# Patient Record
Sex: FEMALE | Race: ASIAN | NOT HISPANIC OR LATINO | URBAN - METROPOLITAN AREA
[De-identification: names, ages, dates, MRNs, and addresses within clinical notes are randomized per-mention and may not be internally consistent; named-entity substitution may affect disease eponyms.]

---

## 2017-01-05 ENCOUNTER — EMERGENCY (EMERGENCY)
Facility: HOSPITAL | Age: 72
LOS: 1 days | Discharge: LEFT BEFORE DISCHARGE | End: 2017-01-05
Admitting: EMERGENCY MEDICINE

## 2018-09-01 ENCOUNTER — OUTPATIENT (OUTPATIENT)
Dept: OUTPATIENT SERVICES | Facility: HOSPITAL | Age: 73
LOS: 1 days | End: 2018-09-01
Payer: MEDICAID

## 2018-09-06 ENCOUNTER — FORM ENCOUNTER (OUTPATIENT)
Age: 73
End: 2018-09-06

## 2018-09-06 ENCOUNTER — APPOINTMENT (OUTPATIENT)
Dept: INTERNAL MEDICINE | Facility: CLINIC | Age: 73
End: 2018-09-06
Payer: MEDICAID

## 2018-09-06 VITALS
SYSTOLIC BLOOD PRESSURE: 125 MMHG | BODY MASS INDEX: 24.24 KG/M2 | DIASTOLIC BLOOD PRESSURE: 75 MMHG | WEIGHT: 142 LBS | TEMPERATURE: 98.6 F | HEIGHT: 64 IN

## 2018-09-06 DIAGNOSIS — Z82.3 FAMILY HISTORY OF STROKE: ICD-10-CM

## 2018-09-06 DIAGNOSIS — Z78.9 OTHER SPECIFIED HEALTH STATUS: ICD-10-CM

## 2018-09-06 PROCEDURE — 80047 BASIC METABLC PNL IONIZED CA: CPT | Mod: QW

## 2018-09-06 PROCEDURE — 99204 OFFICE O/P NEW MOD 45 MIN: CPT | Mod: 25

## 2018-09-06 PROCEDURE — 36415 COLL VENOUS BLD VENIPUNCTURE: CPT

## 2018-09-06 PROCEDURE — 93000 ELECTROCARDIOGRAM COMPLETE: CPT

## 2018-09-07 ENCOUNTER — LABORATORY RESULT (OUTPATIENT)
Age: 73
End: 2018-09-07

## 2018-09-07 ENCOUNTER — APPOINTMENT (OUTPATIENT)
Dept: RADIOLOGY | Facility: CLINIC | Age: 73
End: 2018-09-07
Payer: MEDICAID

## 2018-09-07 ENCOUNTER — OUTPATIENT (OUTPATIENT)
Dept: OUTPATIENT SERVICES | Facility: HOSPITAL | Age: 73
LOS: 1 days | End: 2018-09-07
Payer: MEDICAID

## 2018-09-07 DIAGNOSIS — R06.02 SHORTNESS OF BREATH: ICD-10-CM

## 2018-09-07 PROCEDURE — 71046 X-RAY EXAM CHEST 2 VIEWS: CPT | Mod: 26

## 2018-09-07 PROCEDURE — 71046 X-RAY EXAM CHEST 2 VIEWS: CPT

## 2018-09-13 ENCOUNTER — APPOINTMENT (OUTPATIENT)
Dept: CARDIOLOGY | Facility: CLINIC | Age: 73
End: 2018-09-13
Payer: MEDICAID

## 2018-09-13 PROCEDURE — 93306 TTE W/DOPPLER COMPLETE: CPT

## 2018-09-15 ENCOUNTER — INPATIENT (INPATIENT)
Facility: HOSPITAL | Age: 73
LOS: 2 days | Discharge: HOME | DRG: 287 | End: 2018-09-18
Attending: INTERNAL MEDICINE | Admitting: HOSPITALIST
Payer: MEDICAID

## 2018-09-15 ENCOUNTER — CHART COPY (OUTPATIENT)
Age: 73
End: 2018-09-15

## 2018-09-15 VITALS
SYSTOLIC BLOOD PRESSURE: 164 MMHG | OXYGEN SATURATION: 97 % | TEMPERATURE: 98 F | HEART RATE: 46 BPM | RESPIRATION RATE: 18 BRPM | DIASTOLIC BLOOD PRESSURE: 65 MMHG

## 2018-09-15 LAB
ALBUMIN SERPL ELPH-MCNC: 4.6 G/DL — SIGNIFICANT CHANGE UP (ref 3.3–5)
ALP SERPL-CCNC: 75 U/L — SIGNIFICANT CHANGE UP (ref 40–120)
ALT FLD-CCNC: 43 U/L — SIGNIFICANT CHANGE UP (ref 10–45)
ANION GAP SERPL CALC-SCNC: 11 MMOL/L — SIGNIFICANT CHANGE UP (ref 5–17)
APTT BLD: 27.7 SEC — SIGNIFICANT CHANGE UP (ref 27.5–37.4)
AST SERPL-CCNC: 34 U/L — SIGNIFICANT CHANGE UP (ref 10–40)
BASOPHILS # BLD AUTO: 0.1 K/UL — SIGNIFICANT CHANGE UP (ref 0–0.2)
BASOPHILS NFR BLD AUTO: 1.2 % — SIGNIFICANT CHANGE UP (ref 0–2)
BILIRUB SERPL-MCNC: 0.6 MG/DL — SIGNIFICANT CHANGE UP (ref 0.2–1.2)
BUN SERPL-MCNC: 6 MG/DL — LOW (ref 7–23)
CALCIUM SERPL-MCNC: 9.7 MG/DL — SIGNIFICANT CHANGE UP (ref 8.4–10.5)
CHLORIDE SERPL-SCNC: 101 MMOL/L — SIGNIFICANT CHANGE UP (ref 96–108)
CO2 SERPL-SCNC: 26 MMOL/L — SIGNIFICANT CHANGE UP (ref 22–31)
CREAT SERPL-MCNC: 0.78 MG/DL — SIGNIFICANT CHANGE UP (ref 0.5–1.3)
EOSINOPHIL # BLD AUTO: 0.1 K/UL — SIGNIFICANT CHANGE UP (ref 0–0.5)
EOSINOPHIL NFR BLD AUTO: 3 % — SIGNIFICANT CHANGE UP (ref 0–6)
GLUCOSE SERPL-MCNC: 200 MG/DL — HIGH (ref 70–99)
HCT VFR BLD CALC: 41.7 % — SIGNIFICANT CHANGE UP (ref 34.5–45)
HGB BLD-MCNC: 13.7 G/DL — SIGNIFICANT CHANGE UP (ref 11.5–15.5)
INR BLD: 0.94 RATIO — SIGNIFICANT CHANGE UP (ref 0.88–1.16)
LYMPHOCYTES # BLD AUTO: 1.6 K/UL — SIGNIFICANT CHANGE UP (ref 1–3.3)
LYMPHOCYTES # BLD AUTO: 32.7 % — SIGNIFICANT CHANGE UP (ref 13–44)
MAGNESIUM SERPL-MCNC: 2.3 MG/DL — SIGNIFICANT CHANGE UP (ref 1.6–2.6)
MCHC RBC-ENTMCNC: 29 PG — SIGNIFICANT CHANGE UP (ref 27–34)
MCHC RBC-ENTMCNC: 32.9 GM/DL — SIGNIFICANT CHANGE UP (ref 32–36)
MCV RBC AUTO: 88.1 FL — SIGNIFICANT CHANGE UP (ref 80–100)
MONOCYTES # BLD AUTO: 0.5 K/UL — SIGNIFICANT CHANGE UP (ref 0–0.9)
MONOCYTES NFR BLD AUTO: 10.5 % — SIGNIFICANT CHANGE UP (ref 2–14)
NEUTROPHILS # BLD AUTO: 2.6 K/UL — SIGNIFICANT CHANGE UP (ref 1.8–7.4)
NEUTROPHILS NFR BLD AUTO: 52.6 % — SIGNIFICANT CHANGE UP (ref 43–77)
PHOSPHATE SERPL-MCNC: 3 MG/DL — SIGNIFICANT CHANGE UP (ref 2.5–4.5)
PLATELET # BLD AUTO: 255 K/UL — SIGNIFICANT CHANGE UP (ref 150–400)
POTASSIUM SERPL-MCNC: 3.7 MMOL/L — SIGNIFICANT CHANGE UP (ref 3.5–5.3)
POTASSIUM SERPL-SCNC: 3.7 MMOL/L — SIGNIFICANT CHANGE UP (ref 3.5–5.3)
PROT SERPL-MCNC: 7.6 G/DL — SIGNIFICANT CHANGE UP (ref 6–8.3)
PROTHROM AB SERPL-ACNC: 10.1 SEC — SIGNIFICANT CHANGE UP (ref 9.8–12.7)
RBC # BLD: 4.73 M/UL — SIGNIFICANT CHANGE UP (ref 3.8–5.2)
RBC # FLD: 12.3 % — SIGNIFICANT CHANGE UP (ref 10.3–14.5)
SODIUM SERPL-SCNC: 138 MMOL/L — SIGNIFICANT CHANGE UP (ref 135–145)
TROPONIN T, HIGH SENSITIVITY RESULT: <6 NG/L — SIGNIFICANT CHANGE UP (ref 0–51)
TROPONIN T, HIGH SENSITIVITY RESULT: <6 NG/L — SIGNIFICANT CHANGE UP (ref 0–51)
WBC # BLD: 4.9 K/UL — SIGNIFICANT CHANGE UP (ref 3.8–10.5)
WBC # FLD AUTO: 4.9 K/UL — SIGNIFICANT CHANGE UP (ref 3.8–10.5)

## 2018-09-15 PROCEDURE — 99218: CPT

## 2018-09-15 PROCEDURE — 93010 ELECTROCARDIOGRAM REPORT: CPT

## 2018-09-15 PROCEDURE — 71046 X-RAY EXAM CHEST 2 VIEWS: CPT | Mod: 26

## 2018-09-15 RX ORDER — AMLODIPINE BESYLATE 2.5 MG/1
10 TABLET ORAL DAILY
Qty: 0 | Refills: 0 | Status: DISCONTINUED | OUTPATIENT
Start: 2018-09-15 | End: 2018-09-16

## 2018-09-15 RX ORDER — ASPIRIN/CALCIUM CARB/MAGNESIUM 324 MG
325 TABLET ORAL ONCE
Qty: 0 | Refills: 0 | Status: COMPLETED | OUTPATIENT
Start: 2018-09-15 | End: 2018-09-15

## 2018-09-15 RX ORDER — SIMVASTATIN 20 MG/1
20 TABLET, FILM COATED ORAL AT BEDTIME
Qty: 0 | Refills: 0 | Status: DISCONTINUED | OUTPATIENT
Start: 2018-09-15 | End: 2018-09-18

## 2018-09-15 RX ORDER — ASPIRIN/CALCIUM CARB/MAGNESIUM 324 MG
81 TABLET ORAL DAILY
Qty: 0 | Refills: 0 | Status: DISCONTINUED | OUTPATIENT
Start: 2018-09-15 | End: 2018-09-18

## 2018-09-15 RX ADMIN — Medication 30 MILLILITER(S): at 14:56

## 2018-09-15 RX ADMIN — Medication 325 MILLIGRAM(S): at 13:58

## 2018-09-15 NOTE — ED ADULT NURSE NOTE - CHPI ED NUR SYMPTOMS NEG
no congestion/no chills/no back pain/no nausea/no fever/no shortness of breath/no syncope/no vomiting

## 2018-09-15 NOTE — ED PROVIDER NOTE - PROGRESS NOTE DETAILS
Discussed with Dr. Fuller (covering for patient's cardiologist Dr. Shelton). Would like patient to be admitted for stress or CTA to hospitalist. Discussed with hospitalist & CDU; will place in CDU if delta troponin negative.

## 2018-09-15 NOTE — ED PROVIDER NOTE - NS ED ROS FT
CONSTITUTIONAL: no fevers  HEENT: no dysphagia  CV: + chest pain  RESP: + SOB  GI: no nausea/vomiting  : no dysuria  DERM: no rash  MSK: no back pain  NEURO: no LOC  ENDO: no diabetes

## 2018-09-15 NOTE — ED PROVIDER NOTE - INTERPRETATION
EKG reviewed for rate, rhythm, axis, intervals and segments, including QRS morphology, P wave appearance T wave appearance, AZ interval, and QT interval.  I find the EKG to be unremarkable in all of these regards except as follows: bigeminy

## 2018-09-15 NOTE — ED PROVIDER NOTE - PHYSICAL EXAMINATION
GEN: calm, cooperative, ENT: mucous membranes moist, HEAD: NCAT, CV: S1 S2 no murmurs, RESP: no respiratory distress, ABD: no abdominal TTP, MSK: moves all extremities, NEURO: awake, alert

## 2018-09-15 NOTE — ED CDU PROVIDER INITIAL DAY NOTE - OBJECTIVE STATEMENT
71 y/o F hx of HTN, HLD presents with chest pain. Patient has been experiencing intermittent chest pain for 2-3 months, but this morning awoke with substernal chest tightness radiating to left shoulder associated with dizziness, nausea & diaphoresis. States pain has been constant since this AM; not associated with exertion. Patient did not take ASA prior to arrival. Denies hx of stress test; notified her cardiologist who sent her into ED.   PMD: Enrrique Roman; Cardiologist: Linden Shelton   ID #[id number] used for mandarin interpretation.    In ED CBC, CMP, tropx 2 and coags wnl. CXR shows thoracic aortic atheromatous changes and ectasia noted and stable mild cardiomegaly but otherwise no acute process. EKG with bigeminy. Pt sent to CDU for tele monitoring, frequent evals, and stress test. Case discussed with Dr. De La Cruz. 73 y/o F hx of HTN, HLD presents with chest pain. Patient has been experiencing intermittent chest pain for 2-3 months, but this morning awoke with substernal chest tightness radiating to left shoulder associated with dizziness, nausea & diaphoresis. States pain has been constant since this AM; not associated with exertion. Patient did not take ASA prior to arrival. Denies hx of stress test; notified her cardiologist who sent her into ED.   PMD: Enrrique Roman; Cardiologist: Linden Shelton   ID #[id number] used for mandarin interpretation.    In ED CBC, CMP, tropx 2 and coags wnl. CXR shows thoracic aortic atheromatous changes and ectasia noted and stable mild cardiomegaly but otherwise no acute process. EKG with bigeminy. Pt sent to CDU for tele monitoring, frequent evals, and stress test. Case d/w patients cardiologist. Case discussed with Dr. De La Cruz.

## 2018-09-15 NOTE — ED ADULT NURSE NOTE - OBJECTIVE STATEMENT
72y Female PMH htn, hld presents to the ED c/o CP. 72y Female PMH htn, hld presents to the ED c/o CP. Pt reports intermittent Cp for 2-3 months worsening in the last week, Pt saw cardiologist Linden Shelton last week and has an echo on Thursday (has not received results yet). Pt reports the CP starting last night but stating it was mild and then reports increased pain after breakfast this morning 72y Female PMH htn, hld presents to the ED c/o CP. Pt is Mandarin speaking,  used to obtain information. Pt reports intermittent Cp for 2-3 months worsening in the last week, Pt saw cardiologist Linden Shelton last week and has an echo on Thursday (has not received results yet). Pt reports the CP starting last night but stating it was mild and then reports increased pain after breakfast this morning. Pt reports discomfort to L. side of chest radiating to neck, stating she feels "vibrations to neck." Pt states chest feels tight and she has some numbness to L. shoulder. Pt presents a&oX4, ambulatory, well in appearance, airway intact 72y Female PMH htn, hld presents to the ED c/o CP. Pt is Mandarin speaking,  used to obtain information. Pt reports intermittent Cp for 2-3 months worsening in the last week, Pt saw cardiologist Linden Shelton last week and has an echo on Thursday (has not received results yet). Pt reports the CP starting last night but stating it was mild and then reports increased pain after breakfast this morning. Pt reports discomfort to L. side of chest radiating to neck, stating she feels "vibrations to neck." Pt reported some dizziness, nausea and diaphoresis this morning with the onset of pain. Pt states chest feels tight and she has some numbness to L. shoulder. Pt presents a&oX4, ambulatory, well in appearance, non-diaphoretic, airway intact, breathing spontaneously and unlabored, lung sounds clear bilaterally, skin warm dry and intact, cap refill <3 seconds, +peripheral pulses X4, denies n/v, SOB, ha, numbness/tingling to extremities, palpitations, chills/fever. MD at bedside for eval. safety maintained.

## 2018-09-15 NOTE — ED ADULT NURSE NOTE - NSIMPLEMENTINTERV_GEN_ALL_ED
Implemented All Universal Safety Interventions:  [place] to call system. Call bell, personal items and telephone within reach. Instruct patient to call for assistance. Room bathroom lighting operational. Non-slip footwear when patient is off stretcher. Physically safe environment: no spills, clutter or unnecessary equipment. Stretcher in lowest position, wheels locked, appropriate side rails in place.

## 2018-09-15 NOTE — ED PROVIDER NOTE - OBJECTIVE STATEMENT
PMD: Enrrique Roman  Cardiologist: Linden Shelton 71 y/o F hx of HTN, HLD presents with chest pain.    Patient has been experiencing intermittent chest pain for 2-3 months, but this morning awoke with substernal chest tightness radiating to left shoulder associated with dizziness, nausea & diaphoresis. States pain has been constant since this AM; not associated with exertion. Patient did not take ASA prior to arrival. Denies hx of stress test; notified her cardiologist who sent her into ED.     PMD: Enrrique Roman  Cardiologist: Linden Shelton

## 2018-09-15 NOTE — ED CDU PROVIDER INITIAL DAY NOTE - PROGRESS NOTE DETAILS
Patient is still in the [place] area. Sign out from SERGIO Campuzano, patient to get stress test in the morning, tele, frequent eval. Pt arrived from [place], NAD, comfortable, telemetry shows occasional PVCs. Pt with no current complaints according to son-in law at bedside. Will continue to monitor. Vel  #[id number] - Pt seen at bedside. Pt in NAD, comfortable. VS stable from last reading. No events on tele. Pt has no complaints at this time, she states that her CP resolved breifly after taking Maalox and was greatly improved after taking aspirin, currently not c/o CP and declines medications. pt states she took her home medications, which she has on her, at 20:30. Pt denies SOB, palpitations, abdo pain, n/v, numbness, paresthesias, weakness, new back pain (has chronic low back pain), recent illness, cough, f/c. Pt was told to notify staff if she experiences any of these sxs or of she has any concerns. Pt was informed she will have a stress test in the morning, told not to consume any caffeine, she states that she can walk on a treadmill without issue. Will continue to monitor.

## 2018-09-15 NOTE — ED PROVIDER NOTE - ATTENDING CONTRIBUTION TO CARE
73 yo female with intermittent chest tightness for several months, worse this AM; had echo in cardiologist's office a few days ago, results pending.  Called the office today and was told to come to ER.  Still having mild residual symptoms, in ventricular bigeminy (seen on prior).  Will give ASA, send cardiac enzymes, discuss with cardiology (Linden Shelton) and reassess.

## 2018-09-15 NOTE — ED CDU PROVIDER INITIAL DAY NOTE - DETAILS
CHEST PAIN  -[facility]  -[facility]  -[facility] EVAL  -STRESS TEST  -CASE D/W ATTENDING Dr. De La Cruz

## 2018-09-16 DIAGNOSIS — I49.3 VENTRICULAR PREMATURE DEPOLARIZATION: ICD-10-CM

## 2018-09-16 DIAGNOSIS — R07.89 OTHER CHEST PAIN: ICD-10-CM

## 2018-09-16 DIAGNOSIS — E78.5 HYPERLIPIDEMIA, UNSPECIFIED: ICD-10-CM

## 2018-09-16 DIAGNOSIS — I10 ESSENTIAL (PRIMARY) HYPERTENSION: ICD-10-CM

## 2018-09-16 DIAGNOSIS — R07.9 CHEST PAIN, UNSPECIFIED: ICD-10-CM

## 2018-09-16 DIAGNOSIS — Z29.9 ENCOUNTER FOR PROPHYLACTIC MEASURES, UNSPECIFIED: ICD-10-CM

## 2018-09-16 DIAGNOSIS — R73.03 PREDIABETES: ICD-10-CM

## 2018-09-16 LAB
ANION GAP SERPL CALC-SCNC: 12 MMOL/L — SIGNIFICANT CHANGE UP (ref 5–17)
BUN SERPL-MCNC: 20 MG/DL — SIGNIFICANT CHANGE UP (ref 7–23)
CALCIUM SERPL-MCNC: 9.3 MG/DL — SIGNIFICANT CHANGE UP (ref 8.4–10.5)
CHLORIDE SERPL-SCNC: 104 MMOL/L — SIGNIFICANT CHANGE UP (ref 96–108)
CHOLEST SERPL-MCNC: 142 MG/DL — SIGNIFICANT CHANGE UP (ref 10–199)
CO2 SERPL-SCNC: 25 MMOL/L — SIGNIFICANT CHANGE UP (ref 22–31)
CREAT SERPL-MCNC: 0.8 MG/DL — SIGNIFICANT CHANGE UP (ref 0.5–1.3)
GLUCOSE SERPL-MCNC: 102 MG/DL — HIGH (ref 70–99)
HBA1C BLD-MCNC: 6.1 % — HIGH (ref 4–5.6)
HDLC SERPL-MCNC: 41 MG/DL — LOW
LIPID PNL WITH DIRECT LDL SERPL: 78 MG/DL — SIGNIFICANT CHANGE UP
MAGNESIUM SERPL-MCNC: 2.5 MG/DL — SIGNIFICANT CHANGE UP (ref 1.6–2.6)
PHOSPHATE SERPL-MCNC: 3.6 MG/DL — SIGNIFICANT CHANGE UP (ref 2.5–4.5)
POTASSIUM SERPL-MCNC: 4 MMOL/L — SIGNIFICANT CHANGE UP (ref 3.5–5.3)
POTASSIUM SERPL-SCNC: 4 MMOL/L — SIGNIFICANT CHANGE UP (ref 3.5–5.3)
SODIUM SERPL-SCNC: 141 MMOL/L — SIGNIFICANT CHANGE UP (ref 135–145)
TOTAL CHOLESTEROL/HDL RATIO MEASUREMENT: 3.5 RATIO — SIGNIFICANT CHANGE UP (ref 3.3–7.1)
TRIGL SERPL-MCNC: 117 MG/DL — SIGNIFICANT CHANGE UP (ref 10–149)

## 2018-09-16 PROCEDURE — 99223 1ST HOSP IP/OBS HIGH 75: CPT | Mod: GC

## 2018-09-16 PROCEDURE — 99217: CPT

## 2018-09-16 PROCEDURE — 99222 1ST HOSP IP/OBS MODERATE 55: CPT

## 2018-09-16 PROCEDURE — 78452 HT MUSCLE IMAGE SPECT MULT: CPT | Mod: 26

## 2018-09-16 PROCEDURE — 93016 CV STRESS TEST SUPVJ ONLY: CPT

## 2018-09-16 PROCEDURE — 93018 CV STRESS TEST I&R ONLY: CPT

## 2018-09-16 RX ORDER — SIMVASTATIN 20 MG/1
1 TABLET, FILM COATED ORAL
Qty: 0 | Refills: 0 | COMMUNITY

## 2018-09-16 RX ORDER — METOPROLOL TARTRATE 50 MG
25 TABLET ORAL
Qty: 0 | Refills: 0 | Status: DISCONTINUED | OUTPATIENT
Start: 2018-09-16 | End: 2018-09-16

## 2018-09-16 RX ORDER — ENOXAPARIN SODIUM 100 MG/ML
40 INJECTION SUBCUTANEOUS EVERY 24 HOURS
Qty: 0 | Refills: 0 | Status: DISCONTINUED | OUTPATIENT
Start: 2018-09-16 | End: 2018-09-18

## 2018-09-16 RX ORDER — AMLODIPINE BESYLATE 2.5 MG/1
10 TABLET ORAL DAILY
Qty: 0 | Refills: 0 | Status: DISCONTINUED | OUTPATIENT
Start: 2018-09-16 | End: 2018-09-18

## 2018-09-16 RX ORDER — AMLODIPINE BESYLATE 2.5 MG/1
10 TABLET ORAL DAILY
Qty: 0 | Refills: 0 | Status: DISCONTINUED | OUTPATIENT
Start: 2018-09-16 | End: 2018-09-16

## 2018-09-16 RX ORDER — METOPROLOL TARTRATE 50 MG
25 TABLET ORAL
Qty: 0 | Refills: 0 | Status: DISCONTINUED | OUTPATIENT
Start: 2018-09-16 | End: 2018-09-18

## 2018-09-16 RX ADMIN — ENOXAPARIN SODIUM 40 MILLIGRAM(S): 100 INJECTION SUBCUTANEOUS at 22:03

## 2018-09-16 NOTE — ED CDU PROVIDER SUBSEQUENT DAY NOTE - HISTORY
Pt seen at bedside. Pt in NAD, sleeping comfortably. VS stable from last reading. No events on tele. Will continue to monitor.

## 2018-09-16 NOTE — ED CDU PROVIDER DISPOSITION NOTE - CLINICAL COURSE
71 y/o F hx of HTN, HLD presents with chest pain. Patient has been experiencing intermittent chest pain for 2-3 months, but this morning awoke with substernal chest tightness radiating to left shoulder associated with dizziness, nausea & diaphoresis. States pain has been constant since this AM; not associated with exertion. Patient did not take ASA prior to arrival. Denies hx of stress test; notified her cardiologist who sent her into ED.   	PMD: Enrrique Roman; Cardiologist: Linden Shelton  	 ID #[id number] used for mandarin interpretation.  In ED CBC, CMP, tropx 2 and coags wnl. CXR shows thoracic aortic atheromatous changes and ectasia noted and stable mild cardiomegaly but otherwise no acute process. EKG with bigeminy. Pt sent to CDU for tele monitoring, frequent evals, and stress test. Case d/w patients cardiologist.  In CDU: Pt had no overnight events. Stress test showed___ 73 y/o F hx of HTN, HLD presents with chest pain. Patient has been experiencing intermittent chest pain for 2-3 months, but this morning awoke with substernal chest tightness radiating to left shoulder associated with dizziness, nausea & diaphoresis. States pain has been constant since this AM; not associated with exertion. Patient did not take ASA prior to arrival. Denies hx of stress test; notified her cardiologist who sent her into ED.   	PMD: Enrrique Roman; Cardiologist: Linden Shelton  	 ID #[id number] used for mandarin interpretation.  In ED CBC, CMP, tropx 2 and coags wnl. CXR shows thoracic aortic atheromatous changes and ectasia noted and stable mild cardiomegaly but otherwise no acute process. EKG with bigeminy. Pt sent to CDU for tele monitoring, frequent evals, and stress test. Case d/w patients cardiologist.  In CDU: Pt had no overnight events. Stress test revealed normal perfusion scan, however patient with PVCs during test and bigeminy/trigeminy during recovery. Patient seen by cardiology NP and attending, Dr. Bush, recommending admission for further monitoring, TTE, and medication management.

## 2018-09-16 NOTE — H&P ADULT - NEGATIVE CARDIOVASCULAR SYMPTOMS
no paroxysmal nocturnal dyspnea/no chest pain/no dyspnea on exertion/no orthopnea/no peripheral edema

## 2018-09-16 NOTE — H&P ADULT - PROBLEM SELECTOR PLAN 2
-Multiple PVC noted on stress test and EKG  -Cardiology following.   -Monitor on tele  -C/w metoprolol tartrate 25mg BID. uptitrate as tolerated. -Multiple PVC noted on stress test and EKG  -Cardiology following.   -Monitor on tele  -Monitor BMP daily, keep K>4, Mg>2  -C/w metoprolol tartrate 25mg BID. uptitrate as tolerated.

## 2018-09-16 NOTE — ED CDU PROVIDER DISPOSITION NOTE - PLAN OF CARE
1. Follow up with your PMD in 1-2 days. If you do not have a PMD you may follow up with our general internal medicine clinic ([phone]) for continued care. Additionally please follow up with a cardiologist or cardiology clinic ([phone]) within 2-3 days.   2. Show copies of your reports given to you. Recommend Aspirin 81mg over the counter daily until further evaluation.  Take all of your other medications as previously prescribed.   3. If you develop any worsening or continued chest pain, shortness of breath, palpitations, weakness, nausea/vomiting, lightheadedness, or for any other concerning symptoms please return to the ED immediately.

## 2018-09-16 NOTE — H&P ADULT - HISTORY OF PRESENT ILLNESS
71 yo female with PMHx significant for HTN, HLD present with chest discomfort and tightness. Patient reports her symptoms started about 2-3 months ago, initially started with some chest discomfort and tightness (not pain per patient) in her midchest, radiating up her neck, associated with palpitation, dizziness and nausea, but no vomiting. She check her BP which showed heart rate of 40-60s. These symptoms mostly occur when she woke up in the morning and lasted for about an hour, and happened once every 2-3 days. However, her symptoms was getting more frequently for the past week. She went to see her cardiologist, and had EKG showed NSR with bigeminy, no acute ST/T wave changes. She had TTE done about 2-3 days ago, result still pending. Yesterday morning, she again felt the chest discomfort lasted for few hours, called her cardiologist and was advised to come to ED for further evaluation. She denied fever, chill, headache, diaphoresis, SOB, abdominal pain, acid reflux, diarrhea, myalgia or urinary symptoms.     In the ED, initial VS T 98F, HR 46, /65, RR 18 SpO2 97% on RA. Initial labs only significant for glu 200 (likely post-prandial), with negative troponin X2. CXR with mild cardiomegaly and no infiltrate/consolidation. EKG with NSR and bigeminy PVC, no acute ST/T wave abnormalities concerning for active ischemia. She was given asa 325mg X1. She was initially admitted to CDU, had stress test done which did not show inducible infarction/ischemia, however, showed  multiple PVCs with bigeminy and trigeminy during stress test and recovery. She was seen by cardiology and was recommended to admitted to telemetry for further workup and monitoring. 73 yo Mandarin speaking female with PMHx significant for HTN, HLD present with chest discomfort and tightness. Patient reports her symptoms started about 2-3 months ago, initially started with some chest discomfort and tightness (not pain per patient) in her midchest, radiating up her neck, associated with palpitation, dizziness and nausea, but no vomiting. She check her BP which showed heart rate of 40-60s. These symptoms mostly occur when she woke up in the morning and lasted for about an hour, and happened once every 2-3 days. However, her symptoms was getting more frequently for the past week. She went to see her cardiologist, and had EKG showed NSR with bigeminy, no acute ST/T wave changes. She had TTE done about 2-3 days ago, result still pending. Yesterday morning, she again felt the chest discomfort lasted for few hours, called her cardiologist and was advised to come to ED for further evaluation. She denied fever, chill, headache, diaphoresis, SOB, abdominal pain, acid reflux, diarrhea, myalgia or urinary symptoms.     In the ED, initial VS T 98F, HR 46, /65, RR 18 SpO2 97% on RA. Initial labs only significant for glu 200 (likely post-prandial), with negative troponin X2. CXR with mild cardiomegaly and no infiltrate/consolidation. EKG with NSR and bigeminy PVC, no acute ST/T wave abnormalities concerning for active ischemia. She was given asa 325mg X1. She was initially admitted to CDU, had stress test done which did not show inducible infarction/ischemia, however, showed  multiple PVCs with bigeminy and trigeminy during stress test and recovery. She was seen by cardiology and was recommended to admitted to telemetry for further workup and monitoring.

## 2018-09-16 NOTE — H&P ADULT - ASSESSMENT
73 yo female with PMHx significant for HTN, HLD present with chest discomfort and tightness, found to have negative stress test however with multiple bigeminy and trigeminy, admitted to telemetry for further evaluation. 71 yo Mandarin speaking female with PMHx significant for HTN, HLD present with chest discomfort and tightness, found to have negative stress test however with multiple bigeminy and trigeminy, admitted to telemetry for further evaluation.

## 2018-09-16 NOTE — H&P ADULT - NSHPLABSRESULTS_GEN_ALL_CORE
EKG personally reviewed. NRS with luca. No significant acute ST/T wave abnormalities. Labs personally reviewed:                          13.7   4.9   )-----------( 255      ( 15 Sep 2018 13:59 )             41.7     09-16    141  |  104  |  20  ----------------------------<  102<H>  4.0   |  25  |  0.80    Ca    9.3      16 Sep 2018 22:10  Phos  3.6     09-16  Mg     2.5     09-16    TPro  7.6  /  Alb  4.6  /  TBili  0.6  /  DBili  x   /  AST  34  /  ALT  43  /  AlkPhos  75  09-15        LIVER FUNCTIONS - ( 15 Sep 2018 13:59 )  Alb: 4.6 g/dL / Pro: 7.6 g/dL / ALK PHOS: 75 U/L / ALT: 43 U/L / AST: 34 U/L / GGT: x           PT/INR - ( 15 Sep 2018 13:59 )   PT: 10.1 sec;   INR: 0.94 ratio         PTT - ( 15 Sep 2018 13:59 )  PTT:27.7 sec    CAPILLARY BLOOD GLUCOSE      POCT Blood Glucose.: 102 mg/dL (16 Sep 2018 22:04)      Imaging:  CXR personally reviewed: no focal opacity    EKG personally reviewed. NRS with bigeminy. No significant acute ST/T wave abnormalities.

## 2018-09-16 NOTE — ED ADULT NURSE REASSESSMENT NOTE - COMFORT CARE
ambulated to bathroom/plan of care explained
plan of care explained
warm blanket provided/darkened lights/repositioned/po fluids offered/ambulated to bathroom/plan of care explained

## 2018-09-16 NOTE — ED CDU PROVIDER DISPOSITION NOTE - ATTENDING CONTRIBUTION TO CARE
MD Ca:  I have personally performed a face to face diagnostic evaluation on this patient with the PA.  I have reviewed the ACP note and agree with the history, exam, and plan of care, except as noted.  History and Exam by me shows [pacific  [id number]] 73 yo F, c/o palpitations since Friday night.  Duration: chronic = 3wk (been having PVCs; Primary cardiologist = Linden Shelton), acutely worse = 3d.  Context: states that her HR has been running in the "30s" based upon a portable HR monitor.  Sent to the CDU for Nuclear Stress test, and further bedside evaluation by Cardiology.  Quality: an uncomfortable "trembling" feeling in her chest.  Associated Sx:  patient denies CP/SOB, no N/V, no F/C.  Better/worse: symptoms have improved since getting to the ED yesterday morning.  VS:  wnl. Physical Exam: adult F, NAD, very pleasant, NCAT, PERRL, EOMI, neck supple, CTA, B, RRR, Abd: s/nd/nt.  Ext: no edema.  Ambulates w/o diff.  AAOx3.  Since arriving in the CDU, the patient continues to feel the occasional palpitation, but no more so than the baseline level she has been experiencing for the last 3wk.  Her Nuclear Stress test shows no perfusion defects, but she has demonstrated multiple PVC on her telemetry.  Chart review has demonstrated that the PVCs are a new problem, and that workup only started 3wk ago.   Impression:  frequent ectopy of unclear etiology, although stress testing would suggest the etiology is not ischemic.  Cardiology attending, Dr. Bush, has seen the patient at the bedside, and recommending admit for further w/u on tele, TTE, +/- EP consult.  Plan:  admit hospitalist with cardiology consulting.

## 2018-09-16 NOTE — CONSULT NOTE ADULT - ATTENDING COMMENTS
Frequent PVCS, with bigeminy and trigeminy during stress.   Will admit, watch on tele, echocardiogram and initiate betablocker therapy with metoprolol    Thanks

## 2018-09-16 NOTE — ED CDU PROVIDER SUBSEQUENT DAY NOTE - MEDICAL DECISION MAKING DETAILS
Impression:  frequent ectopy of unclear etiology, although stress testing would suggest the etiology is not ischemic.  Cardiology attending, Dr. Bush, has seen the patient at the bedside, and recommending admit for further w/u on tele, TTE, +/- EP consult.  Plan:  admit hospitalist with cardiology consulting.

## 2018-09-16 NOTE — ED CDU PROVIDER SUBSEQUENT DAY NOTE - ATTENDING CONTRIBUTION TO CARE
MD Ca:  I have personally performed a face to face diagnostic evaluation on this patient with the PA.  I have reviewed the ACP note and agree with the history, exam, and plan of care, except as noted.  History and Exam by me shows [pacific  [id number]] 73 yo F, c/o palpitations since Friday night.  Duration: chronic = 3wk (been having PVCs; Primary cardiologist = Linden Shelton), acutely worse = 3d.  Context: states that her HR has been running in the "30s" based upon a portable HR monitor.  Sent to the CDU for Nuclear Stress test, and further bedside evaluation by Cardiology.  Quality: an uncomfortable "trembling" feeling in her chest.  Associated Sx:  patient denies CP/SOB, no N/V, no F/C.  Better/worse: symptoms have improved since getting to the ED yesterday morning.  VS:  wnl. Physical Exam: adult F, NAD, very pleasant, NCAT, PERRL, EOMI, neck supple, CTA, B, RRR, Abd: s/nd/nt.  Ext: no edema.  Ambulates w/o diff.  AAOx3.  Since arriving in the CDU, the patient continues to feel the occasional palpitation, but no more so than the baseline level she has been experiencing for the last 3wk.  Her Nuclear Stress test shows no perfusion defects, but she has demonstrated multiple PVC on her telemetry.  Chart review has demonstrated that the PVCs are a new problem, and that workup only started 3wk ago.   Impression:  frequent ectopy of unclear etiology, although stress testing would suggest the etiology is not ischemic.  Cardiology attending, Dr. Bush, has seen the patient at the bedside, and recommending admit for further w/u on tele, TTE, +/- EP consult.  Plan:  admit hospitalist with cardiology consulting.

## 2018-09-16 NOTE — ED CDU PROVIDER SUBSEQUENT DAY NOTE - PROGRESS NOTE DETAILS
Pt in NAD, sleeping comfortably. VS stable from last reading. No events on tele. Will continue to monitor. Pt at off unit stress testing. Will follow results and reassess upon return to unit. - Dominic Campuzano PA-C Dr. Bush called and informed me of stress results. Stress itself was normal, however patient had frequent PVCs, bigeminy and trigeminy. ED MD made aware. Paged patient's cardiologist Dr. Linden Shelton. awaiting return page. - Dominic Campuzano PA-C Patient seen at bedside in NAD.  VSS.  Patient resting comfortably without complaints. No events on tele. Appears well. No current cp, sob, dizziness. Stress results pending. Will follow. - Dominic Campuzano PA-C Spoke with Dr. Cueva covering for Dr. Linden Shelton. Informed him of stress result and PVCs/bigeminy/trigeminy during stress and recovery. He recommended in-house cards consult. Will page fellow regarding consult. - Dominic Campuzano PA-C Patient signed out to me pending cardiology consult. Called to confirm cards fellow aware. Patient resting in bed comfortably in NAD. No complaints. Most recent VSS. No new events on telemetry monitor. Patient seen by cardiology NP and attending, Dr. Bush, recommending admission for further monitoring, TTE, and medication management. D/w Dr. Rodriguez. Patient seen by cardiology NP and attending, Dr. Bush, recommending admission for further monitoring, TTE, and medication management. D/w Dr. Rodriguez.  Mandarin  #[id number] used.

## 2018-09-16 NOTE — H&P ADULT - NSHPPHYSICALEXAM_GEN_ALL_CORE
Vital Signs Last 24 Hrs  T(C): 37 (16 Sep 2018 19:21), Max: 37.1 (16 Sep 2018 03:34)  T(F): 98.6 (16 Sep 2018 19:21), Max: 98.8 (16 Sep 2018 03:34)  HR: 65 (16 Sep 2018 19:21) (60 - 75)  BP: 138/76 (16 Sep 2018 19:21) (101/66 - 138/76)  BP(mean): --  RR: 18 (16 Sep 2018 19:21) (18 - 18)  SpO2: 96% (16 Sep 2018 19:21) (96% - 97%)    PHYSICAL EXAM:  GENERAL: NAD, well-developed, lying comfortably in bed  HEAD:  Atraumatic, Normocephalic  EYES: EOMI, PERRLA, conjunctiva and sclera clear  NECK: Supple, No JVD  CHEST/LUNG: Clear to auscultation bilaterally; No wheeze  HEART: Regular rate and rhythm with ectopic beat; faint systolic murmur at right 2nd intercostal space, no rubs, or gallops  ABDOMEN: Soft, Nontender, Nondistended; Bowel sounds present  EXTREMITIES:  2+ Peripheral Pulses, No clubbing, cyanosis, or edema  PSYCH: appropriate mood and affect  NEUROLOGY: non-focal, no sensory or motor deficit  SKIN: No rashes or lesions
none

## 2018-09-16 NOTE — CONSULT NOTE ADULT - SUBJECTIVE AND OBJECTIVE BOX
Patient is a 72y old  Female who presents with a chief complaint of     HPI:      PAST MEDICAL & SURGICAL HISTORY:      PREVIOUS DIAGNOSTIC TESTING::     HOME MEDICATIONS :     MEDICATIONS  (STANDING):  amLODIPine   Tablet 10 milliGRAM(s) Oral daily  aspirin  chewable 81 milliGRAM(s) Oral daily  simvastatin 20 milliGRAM(s) Oral at bedtime    MEDICATIONS  (PRN):      FAMILY HISTORY:      Social History:    Marital Status:   Occupation:   Lives with:     Substance Use :  Tobacco Usage:  (   ) never smoked   (   ) former smoker   (   ) current smoker  (     ) pack year  (        ) last tobacco use date  Alcohol Usage:      Health Management     For female:   Last Mammo:   Last Pap:     For male:  Last prostate exam:          [  ] date:            (  ) findings      Immunization Hx:   (  ) flu shot                               (     ) date   (  ) pneumonia shot               (     ) date  (  ) tetanus                               (     ) date     (     ) Advanced Directives: (     ) declined   [  ] health care proxy    Allergies    penicillin (Anaphylaxis)    Intolerances        REVIEW OF SYSTEMS      General:	    Skin/Breast:  	  Ophthalmologic:  	  ENMT:	    Respiratory and Thorax:  	  Cardiovascular:	    Gastrointestinal:	    Genitourinary:	    Musculoskeletal:	    Neurological:	    Psychiatric:	    Hematology/Lymphatics:	    Endocrine:	    Allergic/Immunologic:	  All others negative.    Vital Signs Last 24 Hrs  T(C): 36.8 (16 Sep 2018 16:19), Max: 37.1 (16 Sep 2018 03:34)  T(F): 98.2 (16 Sep 2018 16:19), Max: 98.8 (16 Sep 2018 03:34)  HR: 63 (16 Sep 2018 16:19) (60 - 75)  BP: 120/73 (16 Sep 2018 16:19) (101/66 - 136/81)  BP(mean): --  RR: 18 (16 Sep 2018 16:19) (18 - 18)  SpO2: 97% (16 Sep 2018 16:19) (96% - 98%)  ICU Vital Signs Last 24 Hrs  T(C): 36.8 (16 Sep 2018 16:19), Max: 37.1 (16 Sep 2018 03:34)  T(F): 98.2 (16 Sep 2018 16:19), Max: 98.8 (16 Sep 2018 03:34)  HR: 63 (16 Sep 2018 16:19) (60 - 75)  BP: 120/73 (16 Sep 2018 16:19) (101/66 - 136/81)  BP(mean): --  ABP: --  ABP(mean): --  RR: 18 (16 Sep 2018 16:19) (18 - 18)  SpO2: 97% (16 Sep 2018 16:19) (96% - 98%)    I&O's Summary    Daily     Daily     PHYSICAL EXAM:      Constitutional:    Eyes:    ENMT:    Neck:    Breasts:    Back:    Respiratory:    Cardiovascular:    Gastrointestinal:    Genitourinary:    Rectal:    Extremities:    Vascular:    Neurological:    Skin:    Lymph Nodes:    Musculoskeletal:    Psychiatric:        TELEMETRY:     EKG:     CARDIAC CATH:     ECHO:    IMAGIN.7   4.9   )-----------( 255      ( 15 Sep 2018 13:59 )             41.7     09-15    138  |  101  |  6<L>  ----------------------------<  200<H>  3.7   |  26  |  0.78    Ca    9.7      15 Sep 2018 13:59  Phos  3.0     -15  Mg     2.3     -15    TPro  7.6  /  Alb  4.6  /  TBili  0.6  /  DBili  x   /  AST  34  /  ALT  43  /  AlkPhos  75  09-15    LIVER FUNCTIONS - ( 15 Sep 2018 13:59 )  Alb: 4.6 g/dL / Pro: 7.6 g/dL / ALK PHOS: 75 U/L / ALT: 43 U/L / AST: 34 U/L / GGT: x             PT/INR - ( 15 Sep 2018 13:59 )   PT: 10.1 sec;   INR: 0.94 ratio         PTT - ( 15 Sep 2018 13:59 )  PTT:27.7 sec    *BLOOD GAS/ARTERIAL/MIXED/VENOUS  *LACTATE      HEALTH ISSUES - PROBLEM Dx:        HEALTH ISSUES - R/O PROBLEM Dx: Patient is a 72y old  Female who presents with a chief complaint of chest tightness radiating to throat and neck    HPI:  71 y/o F hx of HTN, HLD presents with complaints of intermittent chest pain radiating to neck/throat. Patient has been experiencing intermittent chest pain for 2-3 months, but this morning awoke with substernal chest tightness  associated with dizzinesss. States pain has been constant since this AM; not associated with exertion.  Denies hx of stress test; notified her cardiologist who sent her into ED.   	  PMD: Enrrique Roman; Cardiologist: Linden Shelton    In ED, CXR shows thoracic aortic atheromatous changes and ectasia noted and stable mild cardiomegaly but otherwise no acute process. EKG with bigeminy. Stress test revealed normal perfusion scan, however patient with PVCs during test and bigeminy/trigeminy during recovery.     Of note pt saw PMD recently and was noted to have frequent PVC  PAST MEDICAL & SURGICAL HISTORY:  HTN  HLD    HOME MEDICATIONS :   ASA  Amlodipine  simvastatin    MEDICATIONS  (STANDING):  amLODIPine   Tablet 10 milliGRAM(s) Oral daily  aspirin  chewable 81 milliGRAM(s) Oral daily  simvastatin 20 milliGRAM(s) Oral at bedtime    Allergies    penicillin (Anaphylaxis)    Intolerances    REVIEW OF SYSTEMS  General: wakes up with dizziness often   Respiratory and Thorax:	CTA B/L   Cardiovascular:	S1, S2 irregular, no edema  Gastrointestinal:	abd soft, NT,ND  Genitourinary:	abd soft, NT, ND  Musculoskeletal:	 equal strength  Neurological:	A and O x 4  Psychiatric:	appropriate    Allergic/Immunologic:	  All others negative.    Vital Signs Last 24 Hrs  T(C): 36.8 (16 Sep 2018 16:19), Max: 37.1 (16 Sep 2018 03:34)  T(F): 98.2 (16 Sep 2018 16:19), Max: 98.8 (16 Sep 2018 03:34)  HR: 63 (16 Sep 2018 16:19) (60 - 75)  BP: 120/73 (16 Sep 2018 16:19) (101/66 - 136/81)  RR: 18 (16 Sep 2018 16:19) (18 - 18)  SpO2: 97% (16 Sep 2018 16:19) (96% - 98%)  ICU Vital Signs Last 24 Hrs  T(C): 36.8 (16 Sep 2018 16:19), Max: 37.1 (16 Sep 2018 03:34)  T(F): 98.2 (16 Sep 2018 16:19), Max: 98.8 (16 Sep 2018 03:34)  HR: 63 (16 Sep 2018 16:19) (60 - 75)  BP: 120/73 (16 Sep 2018 16:19) (101/66 - 136/81)  RR: 18 (16 Sep 2018 16:19) (18 - 18)  SpO2: 97% (16 Sep 2018 16:19) (96% - 98%)    I&O's Summary    Daily     Daily     PHYSICAL EXAM:  Constitutional: well appearing F in NAD  Respiratory: CTA B/L   Cardiovascular: S1, S2 irregular rhythm  Gastrointestinal: abd soft, NT, ND  Extremities: warm, ND, NT  Vascular: + DP/PT B/L  Neurological: A and O x 4  Skin: warm  Musculoskeletal: equal strength  Psychiatric: appropriate    TELEMETRY: PVCs and trigemminy 80s    EKG: SR w/ PVS    ECHO: pending    IMAGIN.7   4.9   )-----------( 255      ( 15 Sep 2018 13:59 )             41.7     09-15    138  |  101  |  6<L>  ----------------------------<  200<H>  3.7   |  26  |  0.78    Ca    9.7      15 Sep 2018 13:59  Phos  3.0     09-15  Mg     2.3     -15    TPro  7.6  /  Alb  4.6  /  TBili  0.6  /  DBili  x   /  AST  34  /  ALT  43  /  AlkPhos  75  09-15    LIVER FUNCTIONS - ( 15 Sep 2018 13:59 )  Alb: 4.6 g/dL / Pro: 7.6 g/dL / ALK PHOS: 75 U/L / ALT: 43 U/L / AST: 34 U/L / GGT: x             PT/INR - ( 15 Sep 2018 13:59 )   PT: 10.1 sec;   INR: 0.94 ratio         PTT - ( 15 Sep 2018 13:59 )  PTT:27.7 sec

## 2018-09-16 NOTE — ED ADULT NURSE REASSESSMENT NOTE - NS ED NURSE REASSESS COMMENT FT1
Pt remains a&oX4, well in appearance, CDU consult at bedside for eval using  to explain plan of care. Pt will be going to CDU for a stress test. Pt aware of plan, family at bedside. VSS. safety maintained
Pt taken to Xray
Report given to Radha HERZOG on 3dsu 350d VSS no complaints , IV C/D/I pt ok to goo of tele per MD Good. Pending transport team. Safety and comfort maintained.
report taken from Randi HERZOG. pt awaiting NucStress. will continue to monitor.
Pt received from MINO Tran. Pt oriented to CDU & plan of care was discussed. Spoke with  # [id number]. Pt states that she does not have any chest pain, dizziness or palpitations. Pt on cardiac monitor in NSR with PVC's, HR- 60's SERGIO Woodson aware. Pt took own medication at 2030 in the ED. Pt pending stress test in AM. Pt aware that she can't have caffeine before the stress test. Safety & comfort measures maintained. Call bell in reach. Will continue to monitor.
Received pt from MINO Fink, received pt alert and responsive, oriented x4, denies any respiratory distress, SOB, or difficulty breathing. Pt transferred to CDU for chest pain with diaphoresis, pt is currently denying CP, SOB, diaphoresis, dizziness, lightheadedness, N/V, F/C but c/o "a lot of palpitations" Cardiology MD at pt bedside currently. Pt other wise is resting comfortably pt is currently SR with PVC's on telemetry hr:88. Pt pending bed placement. IV in place, patent and free of signs of infiltration, V/S stable, pt afebrile, pt denies pain at this time. Pt educated on unit and unit rules, instructed patient to notify RN of any needed assistance, Pt verbalizes understanding, Call bell placed within reach. Safety maintained. Will continue to monitor. Mandarin phone  used joce Acosta ID#: [id number]

## 2018-09-16 NOTE — CONSULT NOTE ADULT - PROBLEM SELECTOR RECOMMENDATION 9
- Frequent PVCs with runs of NSVT during stress test  - Start Metoprolol 25 BID, cont ASA, and simvastatin  - Obtain ECHO

## 2018-09-16 NOTE — H&P ADULT - PROBLEM SELECTOR PLAN 1
-Patient present with chest tightness and discomfort for 2-3 months, worsening over past week. Found to have negative stress test for ischemia/infarction, however with multiple PVC in bigeminy and trigeminy.   -Negative troponin X2.   -Cardiology following. Appreciate input  -C/w metoprolol tartrate 25mg BID, uptitrate as tolerate  -C/w asa and simvastatin 20mg   -Monitor on tele  -Patient reports having TTE 3 days ago with Dr. Shelton, will call Dr. Shelton for report to avoid repeating the test. -Patient present with chest tightness and discomfort for 2-3 months, worsening over past week. Found to have negative stress test for ischemia/infarction, however with multiple PVC in bigeminy and trigeminy.   -Negative troponin X2.   -Cardiology following. Appreciate input  -C/w metoprolol tartrate 25mg BID, uptitrate as tolerate  -C/w asa and simvastatin 20mg   -Monitor on tele  -Monitor BMP daily, keep K>4, Mg>2  -Patient reports having TTE 3 days ago with Dr. Shelton, will call Dr. Shelton for report to avoid repeating the test.

## 2018-09-17 ENCOUNTER — TRANSCRIPTION ENCOUNTER (OUTPATIENT)
Age: 73
End: 2018-09-17

## 2018-09-17 LAB
ANION GAP SERPL CALC-SCNC: 13 MMOL/L — SIGNIFICANT CHANGE UP (ref 5–17)
BUN SERPL-MCNC: 17 MG/DL — SIGNIFICANT CHANGE UP (ref 7–23)
CALCIUM SERPL-MCNC: 9.5 MG/DL — SIGNIFICANT CHANGE UP (ref 8.4–10.5)
CHLORIDE SERPL-SCNC: 102 MMOL/L — SIGNIFICANT CHANGE UP (ref 96–108)
CO2 SERPL-SCNC: 26 MMOL/L — SIGNIFICANT CHANGE UP (ref 22–31)
CREAT SERPL-MCNC: 0.8 MG/DL — SIGNIFICANT CHANGE UP (ref 0.5–1.3)
GLUCOSE SERPL-MCNC: 94 MG/DL — SIGNIFICANT CHANGE UP (ref 70–99)
HCT VFR BLD CALC: 43.3 % — SIGNIFICANT CHANGE UP (ref 34.5–45)
HGB BLD-MCNC: 14.3 G/DL — SIGNIFICANT CHANGE UP (ref 11.5–15.5)
MAGNESIUM SERPL-MCNC: 2.6 MG/DL — SIGNIFICANT CHANGE UP (ref 1.6–2.6)
MCHC RBC-ENTMCNC: 29.2 PG — SIGNIFICANT CHANGE UP (ref 27–34)
MCHC RBC-ENTMCNC: 33 GM/DL — SIGNIFICANT CHANGE UP (ref 32–36)
MCV RBC AUTO: 88.5 FL — SIGNIFICANT CHANGE UP (ref 80–100)
PHOSPHATE SERPL-MCNC: 4.1 MG/DL — SIGNIFICANT CHANGE UP (ref 2.5–4.5)
PLATELET # BLD AUTO: 260 K/UL — SIGNIFICANT CHANGE UP (ref 150–400)
POTASSIUM SERPL-MCNC: 4.4 MMOL/L — SIGNIFICANT CHANGE UP (ref 3.5–5.3)
POTASSIUM SERPL-SCNC: 4.4 MMOL/L — SIGNIFICANT CHANGE UP (ref 3.5–5.3)
RBC # BLD: 4.89 M/UL — SIGNIFICANT CHANGE UP (ref 3.8–5.2)
RBC # FLD: 12.2 % — SIGNIFICANT CHANGE UP (ref 10.3–14.5)
SODIUM SERPL-SCNC: 141 MMOL/L — SIGNIFICANT CHANGE UP (ref 135–145)
WBC # BLD: 6.2 K/UL — SIGNIFICANT CHANGE UP (ref 3.8–10.5)
WBC # FLD AUTO: 6.2 K/UL — SIGNIFICANT CHANGE UP (ref 3.8–10.5)

## 2018-09-17 PROCEDURE — 99152 MOD SED SAME PHYS/QHP 5/>YRS: CPT | Mod: GC

## 2018-09-17 PROCEDURE — 99233 SBSQ HOSP IP/OBS HIGH 50: CPT | Mod: GC

## 2018-09-17 PROCEDURE — 93458 L HRT ARTERY/VENTRICLE ANGIO: CPT | Mod: 26,GC

## 2018-09-17 PROCEDURE — 99231 SBSQ HOSP IP/OBS SF/LOW 25: CPT

## 2018-09-17 PROCEDURE — 99233 SBSQ HOSP IP/OBS HIGH 50: CPT

## 2018-09-17 PROCEDURE — 93010 ELECTROCARDIOGRAM REPORT: CPT

## 2018-09-17 RX ORDER — ACETAMINOPHEN 500 MG
650 TABLET ORAL ONCE
Qty: 0 | Refills: 0 | Status: COMPLETED | OUTPATIENT
Start: 2018-09-17 | End: 2018-09-17

## 2018-09-17 RX ORDER — METOPROLOL TARTRATE 50 MG
1 TABLET ORAL
Qty: 60 | Refills: 0 | OUTPATIENT
Start: 2018-09-17 | End: 2018-10-16

## 2018-09-17 RX ADMIN — Medication 650 MILLIGRAM(S): at 01:55

## 2018-09-17 RX ADMIN — Medication 25 MILLIGRAM(S): at 05:13

## 2018-09-17 RX ADMIN — Medication 81 MILLIGRAM(S): at 08:54

## 2018-09-17 RX ADMIN — Medication 25 MILLIGRAM(S): at 17:13

## 2018-09-17 RX ADMIN — AMLODIPINE BESYLATE 10 MILLIGRAM(S): 2.5 TABLET ORAL at 05:13

## 2018-09-17 RX ADMIN — Medication 650 MILLIGRAM(S): at 02:35

## 2018-09-17 RX ADMIN — SIMVASTATIN 20 MILLIGRAM(S): 20 TABLET, FILM COATED ORAL at 21:33

## 2018-09-17 RX ADMIN — Medication 1 TABLET(S): at 08:54

## 2018-09-17 NOTE — PROVIDER CONTACT NOTE (MEDICATION) - ASSESSMENT
Pt asleep when episode happened. Pt A/Ox4. Denies chest pain or headache. Not on respiratory distress. /70 HR 68. HR 60 on cardiac monitor

## 2018-09-17 NOTE — DISCHARGE NOTE ADULT - MEDICATION SUMMARY - MEDICATIONS TO TAKE
I will START or STAY ON the medications listed below when I get home from the hospital:    Aspir-Low 81 mg oral delayed release tablet  -- 1 tab(s) by mouth once a day  -- Indication: For Hyperlipidemia    simvastatin 20 mg oral tablet  -- 1 tab(s) by mouth once a day (at bedtime)  -- Indication: For Hyperlipidemia    metoprolol tartrate 25 mg oral tablet  -- 1 tab(s) by mouth 2 times a day  -- Indication: For Hypertension    amLODIPine 10 mg oral tablet  -- 1 tab(s) by mouth once a day  -- Indication: For Hypertension    Multiple Vitamins oral tablet  -- 1 tab(s) by mouth once a day  -- Indication: For Supplement I will START or STAY ON the medications listed below when I get home from the hospital:    Aspir-Low 81 mg oral delayed release tablet  -- 1 tab(s) by mouth once a day  -- Indication: For Hyperlipidemia    simvastatin 20 mg oral tablet  -- 1 tab(s) by mouth once a day (at bedtime)  -- Indication: For Hyperlipidemia    metoprolol tartrate 25 mg oral tablet  -- 1 tab(s) by mouth 2 times a day  -- Indication: For PVC (premature ventricular contraction)    amLODIPine 10 mg oral tablet  -- 1 tab(s) by mouth once a day  -- Indication: For Hypertension    Multiple Vitamins oral tablet  -- 1 tab(s) by mouth once a day  -- Indication: For Supplement

## 2018-09-17 NOTE — PROVIDER CONTACT NOTE (OTHER) - ASSESSMENT
Pt A/Ox4. VSS. Pt states she wants to go home tomorrow. Her daughter will be available tomorrow to come and help her with discharge instructions.  used # [id number] Pt A/Ox4. VSS. Pt states she wants to go home tomorrow. Her daughter will be available tomorrow to come and help her with discharge instructions.  ID # [id number]

## 2018-09-17 NOTE — DISCHARGE NOTE ADULT - CARE PLAN
Principal Discharge DX:	Chest pain, unspecified type  Goal:	Patient will be chest pain free  Assessment and plan of treatment:	Call your doctor if you have any new pain, pressure, or discomfort in the center of your chest, pain, tingling or discomfort in arms, back, neck, jaw, or stomach, shortness of breath, nausea, vomiting, burping or heartburn, sweating, cold and clammy skin, racing or abnormal heartbeat for more than 10 minutes or if they keep coming & going.  Call 911 and do not try to get to hospital by car.  Secondary Diagnosis:	Hypertension  Assessment and plan of treatment:	Continue to follow a low salt/sodium diet.  Perform physical activities as tolerated in consultation with your Primary Care Provider and physical therapist.  Take all medications as prescribed.  Follow up with your medical doctor for routine blood pressure monitoring at your next visit.  Notify your doctor if you have any of the following symptoms:  Dizziness, lightheadedness, blurry vision, headache, chest pain, or shortness of breath.  Secondary Diagnosis:	Hyperlipidemia  Assessment and plan of treatment:	You can help yourself with lifestyle changes (quitting smoking if you smoke), eat lots of fruits & vegetables & low fat dairy products, not a lot of meat & fatty foods, walk or some form of physical activity most days of the week, lose weight if you are overweight.  Take your cardiac medication as prescribed to lower cholesterol, to lower blood pressure, and control your blood sugar.  Secondary Diagnosis:	Prediabetes  Assessment and plan of treatment:	HgA1c this admission was 6.1 - this is a sign that you are at risk for diabetes.  Follow up with your Primary Care Doctor within 1 week - bring your discharge paperwork with you.

## 2018-09-17 NOTE — PROGRESS NOTE ADULT - SUBJECTIVE AND OBJECTIVE BOX
SUBJECTIVE:    No acute events overnight, afebrile, hds.  Pt says chest discomfort improved from when she first came in, but is still experiencing some.  No sob, n/v, abd pn.    VITAL SIGNS:    tele - sinus 55-60, PVCs, bigemminy    Vital Signs Last 24 Hrs  T(C): 36.8 (17 Sep 2018 11:59), Max: 37 (16 Sep 2018 19:21)  T(F): 98.2 (17 Sep 2018 11:59), Max: 98.6 (16 Sep 2018 19:21)  HR: 78 (17 Sep 2018 11:59) (52 - 78)  BP: 110/66 (17 Sep 2018 11:59) (109/68 - 168/83)  BP(mean): --  RR: 18 (17 Sep 2018 11:59) (18 - 18)  SpO2: 96% (17 Sep 2018 11:59) (96% - 98%)      PHYSICAL EXAM:     GENERAL: no acute distress  HEENT: NC/AT, EOMI, neck supple, MMM  RESPIRATORY: LCTAB/L, no rhonchi, rales, or wheezing  CARDIOVASCULAR: irregular, no murmurs, gallops, rubs  ABDOMINAL: soft, non-tender, non-distended, positive bowel sounds   EXTREMITIES: no clubbing, cyanosis, or edema                            14.3   6.2   )-----------( 260      ( 17 Sep 2018 06:47 )             43.3     09-17    141  |  102  |  17  ----------------------------<  94  4.4   |  26  |  0.80    Ca    9.5      17 Sep 2018 06:47  Phos  4.1     09-17  Mg     2.6     09-17        CAPILLARY BLOOD GLUCOSE      POCT Blood Glucose.: 95 mg/dL (17 Sep 2018 09:05)  POCT Blood Glucose.: 102 mg/dL (16 Sep 2018 22:04)      MEDICATIONS  (STANDING):  amLODIPine   Tablet 10 milliGRAM(s) Oral daily  aspirin  chewable 81 milliGRAM(s) Oral daily  enoxaparin Injectable 40 milliGRAM(s) SubCutaneous every 24 hours  metoprolol tartrate 25 milliGRAM(s) Oral two times a day  multivitamin 1 Tablet(s) Oral daily  simvastatin 20 milliGRAM(s) Oral at bedtime

## 2018-09-17 NOTE — PROVIDER CONTACT NOTE (OTHER) - ACTION/TREATMENT ORDERED:
NP made aware. Will continue to monitor patient for now
PA made aware. Will continue to monitor
MD at bedside. Ordered STAT EKG & PO Tylenol. Will continue to  monitor patient

## 2018-09-17 NOTE — PROGRESS NOTE ADULT - SUBJECTIVE AND OBJECTIVE BOX
Patient seen and examined at bedside.    Overnight Events: Pt with still persistent chest pain.       REVIEW OF SYSTEMS:  Constitutional:     [ ] negative [ ] fevers [ ] chills [ ] weight loss [ ] weight gain  HEENT:                  [ ] negative [ ] dry eyes [ ] eye irritation [ ] postnasal drip [ ] nasal congestion  CV:                         [ ] negative  [x ] chest pain [ ] orthopnea [ ] palpitations [ ] murmur  Resp:                     [x ] negative [ ] cough [ ] shortness of breath [ ] dyspnea [ ] wheezing [ ] sputum [ ]hemoptysis  GI:                          [ ] negative [ ] nausea [ ] vomiting [ ] diarrhea [ ] constipation [ ] abd pain [ ] dysphagia   :                        [ ] negative [ ] dysuria [ ] nocturia [ ] hematuria [ ] increased urinary frequency  Musculoskeletal: [ ] negative [ ] back pain [ ] myalgias [ ] arthralgias [ ] fracture  Skin:                       [ ] negative [ ] rash [ ] itch  Neurological:        [ ] negative [ ] headache [ ] dizziness [ ] syncope [ ] weakness [ ] numbness  Psychiatric:           [ ] negative [ ] anxiety [ ] depression  Endocrine:            [ ] negative [ ] diabetes [ ] thyroid problem  Heme/Lymph:      [ ] negative [ ] anemia [ ] bleeding problem  Allergic/Immune: [ ] negative [ ] itchy eyes [ ] nasal discharge [ ] hives [ ] angioedema    [ x] All other systems negative  [ ] Unable to assess ROS because sedated with anoxic brain injury.    Current Meds:  amLODIPine   Tablet 10 milliGRAM(s) Oral daily  aspirin  chewable 81 milliGRAM(s) Oral daily  enoxaparin Injectable 40 milliGRAM(s) SubCutaneous every 24 hours  metoprolol tartrate 25 milliGRAM(s) Oral two times a day  multivitamin 1 Tablet(s) Oral daily  simvastatin 20 milliGRAM(s) Oral at bedtime      PAST MEDICAL & SURGICAL HISTORY:  Hyperlipidemia  Hypertension  No significant past surgical history      Vitals:  T(F): 98.1 (09-17), Max: 98.6 (09-16)  HR: 72 (09-17) (52 - 72)  BP: 114/75 (09-17) (109/68 - 168/83)  RR: 18 (09-17)  SpO2: 96% (09-17)  I&O's Summary    16 Sep 2018 07:01  -  17 Sep 2018 07:00  --------------------------------------------------------  IN: 480 mL / OUT: 0 mL / NET: 480 mL    17 Sep 2018 07:01  -  17 Sep 2018 11:18  --------------------------------------------------------  IN: 300 mL / OUT: 0 mL / NET: 300 mL        Physical Exam:  Appearance: No acute distress; well appearing  Eyes: PERRL, EOMI, pink conjunctiva  HENT: Normal oral mucosa  Cardiovascular: RRR, S1, S2, no murmurs, rubs, or gallops; no edema; no JVD  Respiratory: Clear to auscultation bilaterally  Gastrointestinal: soft, non-tender, non-distended with normal bowel sounds  Musculoskeletal: No clubbing; no joint deformity   Neurologic: Non-focal  Lymphatic: No lymphadenopathy  Psychiatry: AAOx3, mood & affect appropriate  Skin: No rashes, ecchymoses, or cyanosis                          14.3   6.2   )-----------( 260      ( 17 Sep 2018 06:47 )             43.3     09-17    141  |  102  |  17  ----------------------------<  94  4.4   |  26  |  0.80    Ca    9.5      17 Sep 2018 06:47  Phos  4.1     09-17  Mg     2.6     09-17    TPro  7.6  /  Alb  4.6  /  TBili  0.6  /  DBili  x   /  AST  34  /  ALT  43  /  AlkPhos  75  09-15    PT/INR - ( 15 Sep 2018 13:59 )   PT: 10.1 sec;   INR: 0.94 ratio         PTT - ( 15 Sep 2018 13:59 )  PTT:27.7 sec              New ECG(s): Personally reviewed    Stress Testing:   < from: Nuclear Stress Test-Exercise (09.16.18 @ 10:18) >  ------------------------------------------------------------------------  IMPRESSIONS:Normal Scan, Abnormal ECG Study  * Exercise capacity: 10 METS, Excellent for age and  gender.  * Chest Pain: No chest pain with exercise.  * Symptom: Fatigue.  * HR Response: Appropriate.  * BP Response: Appropriate.  * Heart Rhythm: Sinus Rhythm - Frequent APD's, Occassional  VPD's - 63 BPM.  * Q Waves: III, aVF.  * Baseline ECG: Poor R wave progression.  * ECG Changes: ST Depression: .5 mm upsloping in leads V4,  V5, V6 started at 07:16 min of exercise at HR of 122 and  persisted 01:00 min into recovery.  * Arrhythmia: Multiple PVCs during and stress test with  bigeminy and trigeminy during stress test and recovery. .  * The left ventricle was normal in size. Normal myocardial  perfusion scan, with no evidence of infarction or  inducible ischemia.  * Post-stress gated wall motion analysis was performed  (LVEF > 70%;LVEDV = 51 ml.), revealing normal LV function.  Conclusion:  The left ventricle was normal in size. Normal myocardial  perfusion scan, with no evidenceof infarction or  inducible ischemia.  Arrhythmia: Multiple PVCs during and stress test with  bigeminy and trigeminy during stress test and recovery.  ------------------------------------------------------------------------  Confirmed on  9/16/2018 -11:54:23 by Orville Bush D.O.  ------------------------------------------------------------------------    < end of copied text >        Interpretation of Telemetry: Sinus 41-60 with multiple PVS. Hannah Patient seen and examined at bedside.    Overnight Events: Pt with still persistent chest pain. Frightened/tearful.      REVIEW OF SYSTEMS:  Constitutional:     [ ] negative [ ] fevers [ ] chills [ ] weight loss [ ] weight gain  HEENT:                  [ ] negative [ ] dry eyes [ ] eye irritation [ ] postnasal drip [ ] nasal congestion  CV:                         [ ] negative  [x ] chest pain [ ] orthopnea [ ] palpitations [ ] murmur  Resp:                     [x ] negative [ ] cough [ ] shortness of breath [ ] dyspnea [ ] wheezing [ ] sputum [ ]hemoptysis  GI:                          [ ] negative [ ] nausea [ ] vomiting [ ] diarrhea [ ] constipation [ ] abd pain [ ] dysphagia   :                        [ ] negative [ ] dysuria [ ] nocturia [ ] hematuria [ ] increased urinary frequency  Musculoskeletal: [ ] negative [ ] back pain [ ] myalgias [ ] arthralgias [ ] fracture  Skin:                       [ ] negative [ ] rash [ ] itch  Neurological:        [ ] negative [ ] headache [ ] dizziness [ ] syncope [ ] weakness [ ] numbness  Psychiatric:           [ ] negative [ ] anxiety [ ] depression  Endocrine:            [ ] negative [ ] diabetes [ ] thyroid problem  Heme/Lymph:      [ ] negative [ ] anemia [ ] bleeding problem  Allergic/Immune: [ ] negative [ ] itchy eyes [ ] nasal discharge [ ] hives [ ] angioedema    [ x] All other systems negative  [ ] Unable to assess ROS because sedated with anoxic brain injury.    Current Meds:  amLODIPine   Tablet 10 milliGRAM(s) Oral daily  aspirin  chewable 81 milliGRAM(s) Oral daily  enoxaparin Injectable 40 milliGRAM(s) SubCutaneous every 24 hours  metoprolol tartrate 25 milliGRAM(s) Oral two times a day  multivitamin 1 Tablet(s) Oral daily  simvastatin 20 milliGRAM(s) Oral at bedtime      PAST MEDICAL & SURGICAL HISTORY:  Hyperlipidemia  Hypertension  No significant past surgical history      Vitals:  T(F): 98.1 (09-17), Max: 98.6 (09-16)  HR: 72 (09-17) (52 - 72)  BP: 114/75 (09-17) (109/68 - 168/83)  RR: 18 (09-17)  SpO2: 96% (09-17)  I&O's Summary    16 Sep 2018 07:01  -  17 Sep 2018 07:00  --------------------------------------------------------  IN: 480 mL / OUT: 0 mL / NET: 480 mL    17 Sep 2018 07:01  -  17 Sep 2018 11:18  --------------------------------------------------------  IN: 300 mL / OUT: 0 mL / NET: 300 mL        Physical Exam:  Appearance: No acute distress; well appearing  Eyes: PERRL, EOMI, pink conjunctiva  HENT: Normal oral mucosa  Cardiovascular: RRR, S1, S2, no murmurs, rubs, or gallops; no edema; no JVD  Respiratory: Clear to auscultation bilaterally  Gastrointestinal: soft, non-tender, non-distended with normal bowel sounds  Musculoskeletal: No clubbing; no joint deformity   Neurologic: Non-focal  Lymphatic: No lymphadenopathy  Psychiatry: AAOx3, mood & affect appropriate  Skin: No rashes, ecchymoses, or cyanosis                          14.3   6.2   )-----------( 260      ( 17 Sep 2018 06:47 )             43.3     09-17    141  |  102  |  17  ----------------------------<  94  4.4   |  26  |  0.80    Ca    9.5      17 Sep 2018 06:47  Phos  4.1     09-17  Mg     2.6     09-17    TPro  7.6  /  Alb  4.6  /  TBili  0.6  /  DBili  x   /  AST  34  /  ALT  43  /  AlkPhos  75  09-15    PT/INR - ( 15 Sep 2018 13:59 )   PT: 10.1 sec;   INR: 0.94 ratio         PTT - ( 15 Sep 2018 13:59 )  PTT:27.7 sec              New ECG(s): Personally reviewed    Stress Testing:   < from: Nuclear Stress Test-Exercise (09.16.18 @ 10:18) >  ------------------------------------------------------------------------  IMPRESSIONS:Normal Scan, Abnormal ECG Study  * Exercise capacity: 10 METS, Excellent for age and  gender.  * Chest Pain: No chest pain with exercise.  * Symptom: Fatigue.  * HR Response: Appropriate.  * BP Response: Appropriate.  * Heart Rhythm: Sinus Rhythm - Frequent APD's, Occassional  VPD's - 63 BPM.  * Q Waves: III, aVF.  * Baseline ECG: Poor R wave progression.  * ECG Changes: ST Depression: .5 mm upsloping in leads V4,  V5, V6 started at 07:16 min of exercise at HR of 122 and  persisted 01:00 min into recovery.  * Arrhythmia: Multiple PVCs during and stress test with  bigeminy and trigeminy during stress test and recovery. .  * The left ventricle was normal in size. Normal myocardial  perfusion scan, with no evidence of infarction or  inducible ischemia.  * Post-stress gated wall motion analysis was performed  (LVEF > 70%;LVEDV = 51 ml.), revealing normal LV function.  Conclusion:  The left ventricle was normal in size. Normal myocardial  perfusion scan, with no evidenceof infarction or  inducible ischemia.  Arrhythmia: Multiple PVCs during and stress test with  bigeminy and trigeminy during stress test and recovery.  ------------------------------------------------------------------------  Confirmed on  9/16/2018 -11:54:23 by Orville Bush D.O.  ------------------------------------------------------------------------    < end of copied text >        Interpretation of Telemetry: Sinus 41-60 with multiple PVS. Hannah

## 2018-09-17 NOTE — DISCHARGE NOTE ADULT - PLAN OF CARE
Patient will be chest pain free Call your doctor if you have any new pain, pressure, or discomfort in the center of your chest, pain, tingling or discomfort in arms, back, neck, jaw, or stomach, shortness of breath, nausea, vomiting, burping or heartburn, sweating, cold and clammy skin, racing or abnormal heartbeat for more than 10 minutes or if they keep coming & going.  Call 911 and do not try to get to hospital by car. Continue to follow a low salt/sodium diet.  Perform physical activities as tolerated in consultation with your Primary Care Provider and physical therapist.  Take all medications as prescribed.  Follow up with your medical doctor for routine blood pressure monitoring at your next visit.  Notify your doctor if you have any of the following symptoms:  Dizziness, lightheadedness, blurry vision, headache, chest pain, or shortness of breath. You can help yourself with lifestyle changes (quitting smoking if you smoke), eat lots of fruits & vegetables & low fat dairy products, not a lot of meat & fatty foods, walk or some form of physical activity most days of the week, lose weight if you are overweight.  Take your cardiac medication as prescribed to lower cholesterol, to lower blood pressure, and control your blood sugar. HgA1c this admission was 6.1 - this is a sign that you are at risk for diabetes.  Follow up with your Primary Care Doctor within 1 week - bring your discharge paperwork with you.

## 2018-09-17 NOTE — DISCHARGE NOTE ADULT - OTHER SIGNIFICANT FINDINGS
TYPE OF TEST: Rest/Stress SESTAMIBI  INDICATION: Chest pain, unspecified (R07.9)    ------------------------------------------------------------------------  BASELINE ELECTROCARDIOGRAM:  Rhythm: Sinus Rhythm - Frequent APD's, Occassional VPD's -  63 BPM  Conduction Defect: None  Q Waves: III, aVF  ST: Poor R wave progression  ------------------------------------------------------------------------  EXERCISE RESULTS:  TIME      SPEED    GRADE  HR      BP  Baseline  Standing   N/A  63  122/72  03:00     1.7 MPH    10%  99  154/89  06:00     2.5 MPH    12% 113  166/86  08:00     3.4 MPH    14% 134  01:00     Recovery       115  152/86  03:00     Recovery        93  145/68  07:31     Recovery        90  139/70  ------------------------------------------------------------------------  Protocol: Cedric   Exercise Duration: 8: 00 Min  HR: Baseline HR: 63 bpm   Peak HR: 134 bpm (90% of MPHR)  MPHR: 149 bpm   85% of MPHR: 127 bpm  BP: Baseline BP: 122/72 mmHg   Peak BP: 166/86 mmHg   Peak  RPP: 91909 (Rate Pressure Product)  Last Caffeine intake: 12 hrs  Performance: 10 METS, Excellent for age and gender.  Comments: The patient walked on treadmill, target heart  rate was achieved.  ------------------------------------------------------------------------  SYMPTOMS/FINDINGS:  Symptoms: Fatigue  Chest pain: No chest pain with exercise  ------------------------------------------------------------------------  ECG ABNORMALITIES DURING/AFTER STRESS:   ST Changes:ST Depression: .5 mm upsloping in leads V4,  V5, V6 started at 07:16 min of exercise at HR of 122 and  persisted 01:00 min into recovery.  ------------------------------------------------------------------------  NEW ARRHYTHMIAS DEVELOPED DURING/AFTER STRESS:  Multiple PVCs during and stress test with bigeminy and  trigeminy during stress test and recovery.  ------------------------------------------------------------------------  STRESS TEST IMPRESSIONS:  Exercise capacity: 10 METS, Excellent for age and gender.  Chest Pain: No chest pain with exercise.  Symptom: Fatigue.  HR Response: Appropriate.  BP Response: Appropriate.  Heart Rhythm: Sinus Rhythm - Frequent APD's, Occassional  VPD's - 63 BPM.  Q Waves: III, aVF.  Baseline ECG: Poor R wave progression.  ECG Changes: ST Depression: .5 mm upsloping in leads V4,  V5, V6 started at 07:16 min of exercise at HR of 122 and  persisted 01:00 min into recovery.  Arrhythmia: Multiple PVCs during and stress test with  bigeminy and trigeminy during stress test and recovery. .  ------------------------------------------------------------------------    NUCLEAR FINDINGS:  The left ventricle was normal in size. Normal myocardial  perfusion scan, with no evidence of infarction or  inducible ischemia.  ------------------------------------------------------------------------  GATED ANALYSIS:  Post-stress gated wall motion analysis was performed (LVEF  > 70%;LVEDV = 51 ml.), revealing normal LV function.  ------------------------------------------------------------------------  IMPRESSIONS:Normal Scan, Abnormal ECG Study  * Exercise capacity: 10 METS, Excellent for age and  gender.  * Chest Pain: No chest pain with exercise.  * Symptom: Fatigue.  * HR Response: Appropriate.  * BP Response: Appropriate.  * Heart Rhythm: Sinus Rhythm - Frequent APD's, Occassional  VPD's - 63 BPM.  * Q Waves: III, aVF.  * Baseline ECG: Poor R wave progression.  * ECG Changes: ST Depression: .5 mm upsloping in leads V4,  V5, V6 started at 07:16 min of exercise at HR of 122 and  persisted 01:00 min into recovery.  * Arrhythmia: Multiple PVCs during and stress test with  bigeminy and trigeminy during stress test and recovery. .  * The left ventricle was normal in size. Normal myocardial  perfusion scan, with no evidence of infarction or  inducible ischemia.  * Post-stress gated wall motion analysis was performed  (LVEF > 70%;LVEDV = 51 ml.), revealing normal LV function.  Conclusion:  The left ventricle was normal in size. Normal myocardial  perfusion scan, with no evidence of infarction or  inducible ischemia.  Arrhythmia: Multiple PVCs during and stress test with  bigeminy and trigeminy during stress test and recovery.

## 2018-09-17 NOTE — CHART NOTE - NSCHARTNOTEFT_GEN_A_CORE
Spoke with Cardiology fellow, Dr. Kwon regarding findings cath findings of luminal irregularities (prelimary - full cath report pending).  Advises that patient can be discharged home on BB with outpatient follow-up.      Kinga Randolph NP  [phone]

## 2018-09-17 NOTE — PROVIDER CONTACT NOTE (OTHER) - ASSESSMENT
Pt A/Ox4. Mandarin speaking. /69 HR 52. Pt denies chest pain, no headache, Denies nausea and vomiting. Pt sleeping when episode happened.  # [phone].

## 2018-09-17 NOTE — DISCHARGE NOTE ADULT - PATIENT PORTAL LINK FT
You can access the [facility] Patient Portal, offered by [facility], by registering with the following website: [email or web address]

## 2018-09-17 NOTE — PROGRESS NOTE ADULT - PROBLEM SELECTOR PLAN 2
-Multiple PVC noted on stress test and EKG  -Cardiology following, pt now on BB, and ectopy improved; difficult to uptitrate BB further, as HR now at 55-60   -Monitor on tele  -Monitor BMP daily, keep K>4, Mg>2  -C/w metoprolol tartrate 25mg BID. uptitrate as tolerated.

## 2018-09-17 NOTE — PROGRESS NOTE ADULT - ASSESSMENT
71 yo F w/ pmh of HTN, HLD who p/w chest tightness 2/2 frequent PVCs    #Chest pain  - negative Stress test and negative cardiac enzymes.   - PVCs improved w/ addition of metoprolol, however, still having a great deal of CP overnight.   - At this time will do LHC today to r/o CAD.   - If pts cath is negative she should c/w metoprolol on d/c, and can follow up out pt w/ cardiology.   - C/w metoprolol   - c/w ASA and simvastatin.     #HTN  - controlled on amlodipine and metoprolol.    Jim Kwon MD  Cardiology Fellow - PGY-4  [facility]: [id number]  [facility]: [phone]  [id number]

## 2018-09-17 NOTE — CHART NOTE - NSCHARTNOTEFT_GEN_A_CORE
CC: Sinus Bradycardia      HPI:  Called by RN to report that pt noted to have Sinus Bradycardia on tele, down to 41 bpm with PVC's  Patient seen & examined denied having  dizziness, CP, SOB, abdominal  pain, N/V        ROS:  CONSTITUTIONAL:  No fever, chills, rigors  CARDIOVASCULAR:  No chest pain or palpitations  RESPIRATORY:   No SOB  GASTROINTESTINAL:  No abd pain, N/V  EXTREMITIES:  No swelling or joint pain      PAST MEDICAL & SURGICAL HISTORY:  Patient History:    Past Medical History:  Hyperlipidemia    Hypertension.     Past Surgical History:  No significant past surgical history.      Vital Signs Last 24 Hrs  T(C): 36.6 (17 Sep 2018 04:07), Max: 37 (16 Sep 2018 12:26)  T(F): 97.8 (17 Sep 2018 04:07), Max: 98.6 (16 Sep 2018 12:26)  HR: 52 (17 Sep 2018 06:32) (52 - 71)  BP: 116/69 (17 Sep 2018 06:32) (109/68 - 168/83)  BP(mean): --  RR: 18 (17 Sep 2018 04:07) (18 - 18)  SpO2: 96% (17 Sep 2018 04:07) (96% - 98%)      Physical Exam:  General: WN/WD NAD, AOx3, nontoxic appearing  Head:  NC/AT  CV: RRR, S1S2   Respiratory: CTA B/L, nonlabored  Abdominal: (+) bowel sounds x4. Soft, non tender, non distended  MSK: No BLLE edema, + peripheral pulses, FROM all 4 extremity  Skin: (+) warm, dry   Psych: Appropriate affect       Labs:                        14.3   6.2   )-----------( 260      ( 17 Sep 2018 06:47 )             43.3     09-17    141  |  102  |  17  ----------------------------<  94  4.4   |  26  |  0.80    Ca    9.5      17 Sep 2018 06:47  Phos  4.1     09-17  Mg     2.6     09-17    TPro  7.6  /  Alb  4.6  /  TBili  0.6  /  DBili  x   /  AST  34  /  ALT  43  /  AlkPhos  75  09-15      Radiology:    Assessment & Plan:  HPI:  71 yo Mandarin speaking female with PMHx significant for HTN, HLD present with chest discomfort and tightness. Patient reports her symptoms started about 2-3 months ago, initially started with some chest discomfort and tightness (not pain per patient) in her midchest, radiating up her neck, associated with palpitation, dizziness and nausea, but no vomiting. She check her BP which showed heart rate of 40-60s. These symptoms mostly occur when she woke up in the morning and lasted for about an hour, and happened once every 2-3 days. However, her symptoms was getting more frequently for the past week. She went to see her cardiologist, and had EKG showed NSR with bigeminy, no acute ST/T wave changes. She had TTE done about 2-3 days ago, result still pending. Yesterday morning, she again felt the chest discomfort lasted for few hours, called her cardiologist and was advised to come to ED for further evaluation. She denied fever, chill, headache, diaphoresis, SOB, abdominal pain, acid reflux, diarrhea, myalgia or urinary symptoms.   In the ED, initial VS T 98F, HR 46, /65, RR 18 SpO2 97% on RA. Initial labs only significant for glu 200 (likely post-prandial), with negative troponin X2. CXR with mild cardiomegaly and no infiltrate/consolidation. EKG with NSR and bigeminy PVC, no acute ST/T wave abnormalities concerning for active ischemia. She was given asa 325mg X1. She was initially admitted to CDU, had stress test done which did not show inducible infarction/ischemia, however, showed  multiple PVCs with bigeminy and trigeminy during stress test and recovery. She was seen by cardiology and was recommended to admitted to telemetry for further workup and monitoring. (16 Sep 2018 19:57)    Pt seen this AM for noted sinus bradycardia to 41 bpm on tele, with PVC's  Pt remained asymptomatic, and hemodynamically stable    PLAN:  -Continue to monitor  -Continue Metoprolol & Norvasc as ordered  -Primary Team to follow up in AM, attending to follow       Karen Joyner PA-C  Dept of Medicine    15 mins. spent during this pt. encounter

## 2018-09-17 NOTE — PROGRESS NOTE ADULT - ATTENDING COMMENTS
Patient interviewed and examined.  Chart reviewed and note edited where appropriate.  Case discussed with fellow.  Agree w/ Assessment and Plan as outlined.    Matthew Hamm MD [facility]  Spectra:  [id number]  Office: [phone]

## 2018-09-17 NOTE — PROGRESS NOTE ADULT - PROBLEM SELECTOR PLAN 1
-Patient present with chest tightness and discomfort for 2-3 months, worsening over past week. Found to have negative stress test for ischemia/infarction, however with multiple PVC in bigeminy and trigeminyl; after beta blocker added yesterday, overnight, far fewer ectopy, almont no trigeminy, but still some PVCs  -pt's symptoms of chest pain improved, but still persist; as per cards, pt scheduled for cath to r/o coronary occlusion  -Negative troponin X2.   -C/w metoprolol tartrate 25mg BID  -C/w asa and simvastatin 20mg   -Monitor on tele  -Monitor BMP daily, keep K>4, Mg>2

## 2018-09-17 NOTE — DISCHARGE NOTE ADULT - CARE PROVIDER_API CALL
Linden Shelton), Cardiovascular Disease; Internal Medicine  [address] [place] [id number]  Phone: [phone]  Fax: [phone]    Dr. Enrrique Roman, Primary Care Doctor  Phone: (   )    -  Fax: (   )    -

## 2018-09-17 NOTE — DISCHARGE NOTE ADULT - ADDITIONAL INSTRUCTIONS
Follow up with your Cardiologist within 1 week - please obtain an referral to a heart rhythm specialist (Electrophysiologist). Follow up with your Cardiologist within 1 week - please obtain an referral to a heart rhythm specialist (Electrophysiologist).  Follow up with your Primary Care Doctor within 1 week - bring your discharge paperwork with you. Follow up with your Cardiologist by the end of the week to have a repeat echocardiogram - please also obtain an referral to a heart rhythm specialist (Electrophysiologist).  Follow up with your Primary Care Doctor within 1 week - bring your discharge paperwork with you.

## 2018-09-17 NOTE — PROGRESS NOTE ADULT - ASSESSMENT
71 yo Mandarin speaking female with PMHx significant for HTN, HLD present with chest discomfort and tightness, found to have negative stress test however with multiple bigeminy and trigeminy, admitted to telemetry for further evaluation.

## 2018-09-17 NOTE — DISCHARGE NOTE ADULT - PROVIDER TOKENS
TOKEN:'[id number]',FREE:[LAST:[Dr. Enrrique Roman],FIRST:[Primary Care Doctor],PHONE:[(   )    -],FAX:[(   )    -]]

## 2018-09-17 NOTE — PROGRESS NOTE ADULT - PROBLEM SELECTOR PLAN 5
-A1c of 6.1%.   -Advised patient to continue with diet/lifestyle modification  -Consider starting metformin as outpatient  - monitor blood sugar for now

## 2018-09-18 VITALS
SYSTOLIC BLOOD PRESSURE: 104 MMHG | HEART RATE: 67 BPM | TEMPERATURE: 98 F | RESPIRATION RATE: 18 BRPM | DIASTOLIC BLOOD PRESSURE: 64 MMHG | OXYGEN SATURATION: 97 %

## 2018-09-18 LAB
ANION GAP SERPL CALC-SCNC: 14 MMOL/L — SIGNIFICANT CHANGE UP (ref 5–17)
BUN SERPL-MCNC: 20 MG/DL — SIGNIFICANT CHANGE UP (ref 7–23)
CALCIUM SERPL-MCNC: 9.6 MG/DL — SIGNIFICANT CHANGE UP (ref 8.4–10.5)
CHLORIDE SERPL-SCNC: 104 MMOL/L — SIGNIFICANT CHANGE UP (ref 96–108)
CO2 SERPL-SCNC: 21 MMOL/L — LOW (ref 22–31)
CREAT SERPL-MCNC: 0.82 MG/DL — SIGNIFICANT CHANGE UP (ref 0.5–1.3)
GLUCOSE SERPL-MCNC: 101 MG/DL — HIGH (ref 70–99)
HCT VFR BLD CALC: 45.1 % — HIGH (ref 34.5–45)
HGB BLD-MCNC: 14.8 G/DL — SIGNIFICANT CHANGE UP (ref 11.5–15.5)
MAGNESIUM SERPL-MCNC: 2.4 MG/DL — SIGNIFICANT CHANGE UP (ref 1.6–2.6)
MCHC RBC-ENTMCNC: 29.1 PG — SIGNIFICANT CHANGE UP (ref 27–34)
MCHC RBC-ENTMCNC: 32.9 GM/DL — SIGNIFICANT CHANGE UP (ref 32–36)
MCV RBC AUTO: 88.5 FL — SIGNIFICANT CHANGE UP (ref 80–100)
PHOSPHATE SERPL-MCNC: 3.4 MG/DL — SIGNIFICANT CHANGE UP (ref 2.5–4.5)
PLATELET # BLD AUTO: 278 K/UL — SIGNIFICANT CHANGE UP (ref 150–400)
POTASSIUM SERPL-MCNC: 4.1 MMOL/L — SIGNIFICANT CHANGE UP (ref 3.5–5.3)
POTASSIUM SERPL-SCNC: 4.1 MMOL/L — SIGNIFICANT CHANGE UP (ref 3.5–5.3)
RBC # BLD: 5.09 M/UL — SIGNIFICANT CHANGE UP (ref 3.8–5.2)
RBC # FLD: 12.2 % — SIGNIFICANT CHANGE UP (ref 10.3–14.5)
SODIUM SERPL-SCNC: 139 MMOL/L — SIGNIFICANT CHANGE UP (ref 135–145)
WBC # BLD: 8.6 K/UL — SIGNIFICANT CHANGE UP (ref 3.8–10.5)
WBC # FLD AUTO: 8.6 K/UL — SIGNIFICANT CHANGE UP (ref 3.8–10.5)

## 2018-09-18 PROCEDURE — 99239 HOSP IP/OBS DSCHRG MGMT >30: CPT

## 2018-09-18 PROCEDURE — A9500: CPT

## 2018-09-18 PROCEDURE — 82962 GLUCOSE BLOOD TEST: CPT

## 2018-09-18 PROCEDURE — C1769: CPT

## 2018-09-18 PROCEDURE — 78452 HT MUSCLE IMAGE SPECT MULT: CPT

## 2018-09-18 PROCEDURE — 93458 L HRT ARTERY/VENTRICLE ANGIO: CPT

## 2018-09-18 PROCEDURE — 83036 HEMOGLOBIN GLYCOSYLATED A1C: CPT

## 2018-09-18 PROCEDURE — 84484 ASSAY OF TROPONIN QUANT: CPT

## 2018-09-18 PROCEDURE — 83735 ASSAY OF MAGNESIUM: CPT

## 2018-09-18 PROCEDURE — G0378: CPT

## 2018-09-18 PROCEDURE — 71046 X-RAY EXAM CHEST 2 VIEWS: CPT

## 2018-09-18 PROCEDURE — C1887: CPT

## 2018-09-18 PROCEDURE — 80061 LIPID PANEL: CPT

## 2018-09-18 PROCEDURE — 85610 PROTHROMBIN TIME: CPT

## 2018-09-18 PROCEDURE — 99285 EMERGENCY DEPT VISIT HI MDM: CPT | Mod: 25

## 2018-09-18 PROCEDURE — 99152 MOD SED SAME PHYS/QHP 5/>YRS: CPT

## 2018-09-18 PROCEDURE — 85730 THROMBOPLASTIN TIME PARTIAL: CPT

## 2018-09-18 PROCEDURE — C1894: CPT

## 2018-09-18 PROCEDURE — 84100 ASSAY OF PHOSPHORUS: CPT

## 2018-09-18 PROCEDURE — 93005 ELECTROCARDIOGRAM TRACING: CPT

## 2018-09-18 PROCEDURE — 85027 COMPLETE CBC AUTOMATED: CPT

## 2018-09-18 PROCEDURE — 80053 COMPREHEN METABOLIC PANEL: CPT

## 2018-09-18 PROCEDURE — 93017 CV STRESS TEST TRACING ONLY: CPT

## 2018-09-18 PROCEDURE — 80048 BASIC METABOLIC PNL TOTAL CA: CPT

## 2018-09-18 RX ORDER — METOPROLOL TARTRATE 50 MG
1 TABLET ORAL
Qty: 60 | Refills: 0 | OUTPATIENT
Start: 2018-09-18 | End: 2018-10-17

## 2018-09-18 RX ADMIN — Medication 81 MILLIGRAM(S): at 12:07

## 2018-09-18 RX ADMIN — Medication 1 TABLET(S): at 12:07

## 2018-09-18 RX ADMIN — Medication 25 MILLIGRAM(S): at 05:45

## 2018-09-18 RX ADMIN — AMLODIPINE BESYLATE 10 MILLIGRAM(S): 2.5 TABLET ORAL at 05:45

## 2018-09-18 NOTE — PROGRESS NOTE ADULT - SUBJECTIVE AND OBJECTIVE BOX
SUBJECTIVE:    No acute events overnight, afebrile, hds.  No cp, sob, n/v, abd pn.    VITAL SIGNS:    Vital Signs Last 24 Hrs  T(C): 36.8 (18 Sep 2018 04:32), Max: 36.8 (17 Sep 2018 16:45)  T(F): 98.3 (18 Sep 2018 04:32), Max: 98.3 (18 Sep 2018 04:32)  HR: 67 (18 Sep 2018 04:32) (61 - 72)  BP: 104/64 (18 Sep 2018 04:32) (98/58 - 123/80)  BP(mean): --  RR: 18 (18 Sep 2018 04:32) (18 - 19)  SpO2: 97% (18 Sep 2018 04:32) (95% - 98%)    PHYSICAL EXAM:     GENERAL: no acute distress  HEENT: NC/AT, EOMI, neck supple, MMM  RESPIRATORY: LCTAB/L, no rhonchi, rales, or wheezing  CARDIOVASCULAR: RRR, no murmurs, gallops, rubs  ABDOMINAL: soft, non-tender, non-distended, positive bowel sounds   EXTREMITIES: no clubbing, cyanosis, or edema                          14.8   8.6   )-----------( 278      ( 18 Sep 2018 06:59 )             45.1     09-18    139  |  104  |  20  ----------------------------<  101<H>  4.1   |  21<L>  |  0.82    Ca    9.6      18 Sep 2018 06:59  Phos  3.4     09-18  Mg     2.4     09-18        CAPILLARY BLOOD GLUCOSE      POCT Blood Glucose.: 134 mg/dL (18 Sep 2018 08:43)  POCT Blood Glucose.: 104 mg/dL (17 Sep 2018 22:02)  POCT Blood Glucose.: 169 mg/dL (17 Sep 2018 17:53)      MEDICATIONS  (STANDING):  amLODIPine   Tablet 10 milliGRAM(s) Oral daily  aspirin  chewable 81 milliGRAM(s) Oral daily  enoxaparin Injectable 40 milliGRAM(s) SubCutaneous every 24 hours  metoprolol tartrate 25 milliGRAM(s) Oral two times a day  multivitamin 1 Tablet(s) Oral daily  simvastatin 20 milliGRAM(s) Oral at bedtime

## 2018-09-18 NOTE — PROGRESS NOTE ADULT - PROBLEM SELECTOR PLAN 2
-Multiple PVC and ectopy improved with BB  -Monitor on tele  -Monitor BMP daily, keep K>4, Mg>2  -C/w metoprolol tartrate 25mg BID. uptitrate as tolerated.

## 2018-09-18 NOTE — PROGRESS NOTE ADULT - ASSESSMENT
73 yo Mandarin speaking female with PMHx significant for HTN, HLD present with chest discomfort and tightness, found to have negative stress test however with multiple bigeminy and trigeminy, admitted to telemetry for further evaluation, negative stress test, negative cath, ectopy and symptoms improved on BB

## 2018-09-18 NOTE — PROGRESS NOTE ADULT - PROBLEM SELECTOR PLAN 1
-Patient present with chest tightness and discomfort for 2-3 months, worsening over past week. Found to have negative stress test for ischemia/infarction, cath also negative; however, with multiple PVC in bigeminy and trigeminy on tele - after beta blocker added far fewer ectopy, and chest pain largely resolved  -C/w metoprolol tartrate 25mg BID  -C/w asa and simvastatin 20mg   -as per d/w cards today, pt stable for d/c and to f/u with Dr. Shelton next week and have 2d echo as outpt in cards office

## 2018-09-18 NOTE — PROGRESS NOTE ADULT - ASSESSMENT
71 yo F w/ pmh of HTN, HLD who p/w chest tightness 2/2 frequent PVCs    #chest discomfort likely 2/2 Frequent PVCs  - negative cardiac cath.   - PVCs improved w/ addition of metoprolol.  - C/w metoprolol on d/c.  - c/w ASA and simvastatin.  - Can check TTE, however, should not hold up d/c.   - Pt should have out pt cardiology follow up.      #HTN  - controlled on amlodipine and metoprolol.    Jim Kwon MD  Cardiology Fellow - PGY-4  [facility]: [id number]  [facility]: [phone]  [id number] 71 yo F w/ pmh of HTN, HLD who p/w chest tightness 2/2 frequent PVCs    #chest discomfort likely 2/2 Frequent PVCs  - negative cardiac cath.   - PVCs nearly completely suppressed w/ resolution of symptoms   - C/w metoprolol on d/c.  - c/w ASA and simvastatin.  - Can check TTE, however, should not hold up d/c.   - Pt should have out pt cardiology follow up.      #HTN  - controlled on amlodipine and metoprolol.    Jim Kwon MD  Cardiology Fellow - PGY-4  [facility]: [id number]  [facility]: [phone]  [id number]

## 2018-09-18 NOTE — PROGRESS NOTE ADULT - PROBLEM SELECTOR PROBLEM 2
Chest pain, unspecified type
Chest pain, unspecified type
PVC (premature ventricular contraction)
PVC (premature ventricular contraction)

## 2018-09-18 NOTE — PROGRESS NOTE ADULT - SUBJECTIVE AND OBJECTIVE BOX
Patient seen and examined at bedside.    Overnight Events:       REVIEW OF SYSTEMS:  Constitutional:     [ ] negative [ ] fevers [ ] chills [ ] weight loss [ ] weight gain  HEENT:                  [ ] negative [ ] dry eyes [ ] eye irritation [ ] postnasal drip [ ] nasal congestion  CV:                         [ ] negative  [ ] chest pain [ ] orthopnea [ ] palpitations [ ] murmur  Resp:                     [ ] negative [ ] cough [ ] shortness of breath [ ] dyspnea [ ] wheezing [ ] sputum [ ]hemoptysis  GI:                          [ ] negative [ ] nausea [ ] vomiting [ ] diarrhea [ ] constipation [ ] abd pain [ ] dysphagia   :                        [ ] negative [ ] dysuria [ ] nocturia [ ] hematuria [ ] increased urinary frequency  Musculoskeletal: [ ] negative [ ] back pain [ ] myalgias [ ] arthralgias [ ] fracture  Skin:                       [ ] negative [ ] rash [ ] itch  Neurological:        [ ] negative [ ] headache [ ] dizziness [ ] syncope [ ] weakness [ ] numbness  Psychiatric:           [ ] negative [ ] anxiety [ ] depression  Endocrine:            [ ] negative [ ] diabetes [ ] thyroid problem  Heme/Lymph:      [ ] negative [ ] anemia [ ] bleeding problem  Allergic/Immune: [ ] negative [ ] itchy eyes [ ] nasal discharge [ ] hives [ ] angioedema    [ ] All other systems negative  [ ] Unable to assess ROS because sedated with anoxic brain injury.    Current Meds:  amLODIPine   Tablet 10 milliGRAM(s) Oral daily  aspirin  chewable 81 milliGRAM(s) Oral daily  enoxaparin Injectable 40 milliGRAM(s) SubCutaneous every 24 hours  metoprolol tartrate 25 milliGRAM(s) Oral two times a day  multivitamin 1 Tablet(s) Oral daily  simvastatin 20 milliGRAM(s) Oral at bedtime      PAST MEDICAL & SURGICAL HISTORY:  Hyperlipidemia  Hypertension  No significant past surgical history      Vitals:  T(F): 98.3 (09-18), Max: 98.3 (09-18)  HR: 67 (09-18) (52 - 78)  BP: 104/64 (09-18) (98/58 - 123/80)  RR: 18 (09-18)  SpO2: 97% (09-18)  I&O's Summary    16 Sep 2018 07:01  -  17 Sep 2018 07:00  --------------------------------------------------------  IN: 480 mL / OUT: 0 mL / NET: 480 mL    17 Sep 2018 07:01  -  18 Sep 2018 05:14  --------------------------------------------------------  IN: 660 mL / OUT: 0 mL / NET: 660 mL        Physical Exam:  Appearance: No acute distress; well appearing  Eyes: PERRL, EOMI, pink conjunctiva  HENT: Normal oral mucosa  Cardiovascular: RRR, S1, S2, no murmurs, rubs, or gallops; no edema; no JVD  Respiratory: Clear to auscultation bilaterally  Gastrointestinal: soft, non-tender, non-distended with normal bowel sounds  Musculoskeletal: No clubbing; no joint deformity   Neurologic: Non-focal  Lymphatic: No lymphadenopathy  Psychiatry: AAOx3, mood & affect appropriate  Skin: No rashes, ecchymoses, or cyanosis                          14.3   6.2   )-----------( 260      ( 17 Sep 2018 06:47 )             43.3     09-17    141  |  102  |  17  ----------------------------<  94  4.4   |  26  |  0.80    Ca    9.5      17 Sep 2018 06:47  Phos  4.1     09-17  Mg     2.6     09-17        New ECG(s): Personally reviewed    Echo:    Stress Testing:   < from: Nuclear Stress Test-Exercise (09.16.18 @ 10:18) >  ------------------------------------------------------------------------  IMPRESSIONS:Normal Scan, Abnormal ECG Study  * Exercise capacity: 10 METS, Excellent for age and  gender.  * Chest Pain: No chest pain with exercise.  * Symptom: Fatigue.  * HR Response: Appropriate.  * BP Response: Appropriate.  * Heart Rhythm: Sinus Rhythm - Frequent APD's, Occassional  VPD's - 63 BPM.  * Q Waves: III, aVF.  * Baseline ECG: Poor R wave progression.  * ECG Changes: ST Depression: .5 mm upsloping in leads V4,  V5, V6 started at 07:16 min of exercise at HR of 122 and  persisted 01:00 min into recovery.  * Arrhythmia: Multiple PVCs during and stress test with  bigeminy and trigeminy during stress test and recovery. .  * The left ventricle was normal in size. Normal myocardial  perfusion scan, with no evidence of infarction or  inducible ischemia.  * Post-stress gated wall motion analysis was performed  (LVEF > 70%;LVEDV = 51 ml.), revealing normal LV function.  Conclusion:  The left ventricle was normal in size. Normal myocardial  perfusion scan, with no evidenceof infarction or  inducible ischemia.  Arrhythmia: Multiple PVCs during and stress test with  bigeminy and trigeminy during stress test and recovery.  ------------------------------------------------------------------------  Confirmed on  9/16/2018 -11:54:23 by Orville Bush D.O.  ------------------------------------------------------------------------    < end of copied text >    Cath: Clean coronaries.     Interpretation of Telemetry: Patient seen and examined at bedside.    Overnight Events: Pt states that she feels well. No more chest pain.       REVIEW OF SYSTEMS:  Constitutional:     [ ] negative [ ] fevers [ ] chills [ ] weight loss [ ] weight gain  HEENT:                  [ ] negative [ ] dry eyes [ ] eye irritation [ ] postnasal drip [ ] nasal congestion  CV:                         [x ] negative  [ ] chest pain [ ] orthopnea [ ] palpitations [ ] murmur  Resp:                     [ ] negative [ ] cough [ ] shortness of breath [ ] dyspnea [ ] wheezing [ ] sputum [ ]hemoptysis  GI:                          [ ] negative [ ] nausea [ ] vomiting [ ] diarrhea [ ] constipation [ ] abd pain [ ] dysphagia   :                        [ ] negative [ ] dysuria [ ] nocturia [ ] hematuria [ ] increased urinary frequency  Musculoskeletal: [ ] negative [ ] back pain [ ] myalgias [ ] arthralgias [ ] fracture  Skin:                       [ ] negative [ ] rash [ ] itch  Neurological:        [ ] negative [ ] headache [ ] dizziness [ ] syncope [ ] weakness [ ] numbness  Psychiatric:           [ ] negative [ ] anxiety [ ] depression  Endocrine:            [ ] negative [ ] diabetes [ ] thyroid problem  Heme/Lymph:      [ ] negative [ ] anemia [ ] bleeding problem  Allergic/Immune: [ ] negative [ ] itchy eyes [ ] nasal discharge [ ] hives [ ] angioedema    [x ] All other systems negative  [ ] Unable to assess ROS because sedated with anoxic brain injury.    Current Meds:  amLODIPine   Tablet 10 milliGRAM(s) Oral daily  aspirin  chewable 81 milliGRAM(s) Oral daily  enoxaparin Injectable 40 milliGRAM(s) SubCutaneous every 24 hours  metoprolol tartrate 25 milliGRAM(s) Oral two times a day  multivitamin 1 Tablet(s) Oral daily  simvastatin 20 milliGRAM(s) Oral at bedtime      PAST MEDICAL & SURGICAL HISTORY:  Hyperlipidemia  Hypertension  No significant past surgical history      Vitals:  T(F): 98.3 (09-18), Max: 98.3 (09-18)  HR: 67 (09-18) (52 - 78)  BP: 104/64 (09-18) (98/58 - 123/80)  RR: 18 (09-18)  SpO2: 97% (09-18)  I&O's Summary    16 Sep 2018 07:01  -  17 Sep 2018 07:00  --------------------------------------------------------  IN: 480 mL / OUT: 0 mL / NET: 480 mL    17 Sep 2018 07:01  -  18 Sep 2018 05:14  --------------------------------------------------------  IN: 660 mL / OUT: 0 mL / NET: 660 mL        Physical Exam:  Appearance: No acute distress; well appearing  Eyes: PERRL, EOMI, pink conjunctiva  HENT: Normal oral mucosa  Cardiovascular: RRR, split S1, S2, no murmurs, rubs, or gallops; no edema; no JVD  Respiratory: Clear to auscultation bilaterally  Gastrointestinal: soft, non-tender, non-distended with normal bowel sounds  Musculoskeletal: No clubbing; no joint deformity   Neurologic: Non-focal  Lymphatic: No lymphadenopathy  Psychiatry: AAOx3, mood & affect appropriate  Skin: No rashes, ecchymoses, or cyanosis                          14.3   6.2   )-----------( 260      ( 17 Sep 2018 06:47 )             43.3     09-17    141  |  102  |  17  ----------------------------<  94  4.4   |  26  |  0.80    Ca    9.5      17 Sep 2018 06:47  Phos  4.1     09-17  Mg     2.6     09-17        New ECG(s): Personally reviewed    Echo:    Stress Testing:   < from: Nuclear Stress Test-Exercise (09.16.18 @ 10:18) >  ------------------------------------------------------------------------  IMPRESSIONS:Normal Scan, Abnormal ECG Study  * Exercise capacity: 10 METS, Excellent for age and  gender.  * Chest Pain: No chest pain with exercise.  * Symptom: Fatigue.  * HR Response: Appropriate.  * BP Response: Appropriate.  * Heart Rhythm: Sinus Rhythm - Frequent APD's, Occassional  VPD's - 63 BPM.  * Q Waves: III, aVF.  * Baseline ECG: Poor R wave progression.  * ECG Changes: ST Depression: .5 mm upsloping in leads V4,  V5, V6 started at 07:16 min of exercise at HR of 122 and  persisted 01:00 min into recovery.  * Arrhythmia: Multiple PVCs during and stress test with  bigeminy and trigeminy during stress test and recovery. .  * The left ventricle was normal in size. Normal myocardial  perfusion scan, with no evidence of infarction or  inducible ischemia.  * Post-stress gated wall motion analysis was performed  (LVEF > 70%;LVEDV = 51 ml.), revealing normal LV function.  Conclusion:  The left ventricle was normal in size. Normal myocardial  perfusion scan, with no evidenceof infarction or  inducible ischemia.  Arrhythmia: Multiple PVCs during and stress test with  bigeminy and trigeminy during stress test and recovery.  ------------------------------------------------------------------------  Confirmed on  9/16/2018 -11:54:23 by Orville Bush D.O.  ------------------------------------------------------------------------    < end of copied text >    Cath: Clean coronaries.     Interpretation of Telemetry: Sinus 50-70 minimal PVCs

## 2018-09-21 PROBLEM — I10 ESSENTIAL (PRIMARY) HYPERTENSION: Chronic | Status: ACTIVE | Noted: 2018-09-16

## 2018-09-21 PROBLEM — E78.5 HYPERLIPIDEMIA, UNSPECIFIED: Chronic | Status: ACTIVE | Noted: 2018-09-16

## 2018-09-24 DIAGNOSIS — Z71.89 OTHER SPECIFIED COUNSELING: ICD-10-CM

## 2018-09-25 ENCOUNTER — APPOINTMENT (OUTPATIENT)
Dept: INTERNAL MEDICINE | Facility: CLINIC | Age: 73
End: 2018-09-25
Payer: MEDICAID

## 2018-09-25 VITALS
BODY MASS INDEX: 23.9 KG/M2 | OXYGEN SATURATION: 98 % | HEART RATE: 46 BPM | WEIGHT: 140 LBS | HEIGHT: 64 IN | DIASTOLIC BLOOD PRESSURE: 68 MMHG | SYSTOLIC BLOOD PRESSURE: 124 MMHG | TEMPERATURE: 98.1 F

## 2018-09-25 DIAGNOSIS — R73.02 IMPAIRED GLUCOSE TOLERANCE (ORAL): ICD-10-CM

## 2018-09-25 DIAGNOSIS — Z82.49 FAMILY HISTORY OF ISCHEMIC HEART DISEASE AND OTHER DISEASES OF THE CIRCULATORY SYSTEM: ICD-10-CM

## 2018-09-25 PROCEDURE — 99496 TRANSJ CARE MGMT HIGH F2F 7D: CPT

## 2018-09-25 NOTE — PHYSICAL EXAM
[No Spinal Tenderness] : no spinal tenderness [Grossly Normal Strength/Tone] : grossly normal strength/tone [de-identified] : good ROM of spine- pain upper lumbar paraspinal muscles [de-identified] : MS 5/5 L EXt [de-identified] : strg leg raise neg

## 2018-09-25 NOTE — HISTORY OF PRESENT ILLNESS
[FreeTextEntry8] : CC: f/u hospital - 9/16/-9/18\par \par pt accompanied by her daughter. - who served as \par Pt had chest discomfort for 15 days prior to hospitalization.  pt was noted to have bradycardia\par nuclear stress test- neg but had mult pVC, bigeminy, trigeminy- nl electrolytes. tx w metoprolol\par cath- neg.\par no further CP, palpitation,  since discharge.  dizziness - lightheadness has improved- prerna when getting out of bed in the morning. \par \par lower mild dull back pain after discharge from hospital-  1 wk- daily. worse on lying in bed or trying to get up .  better w activity\par no fever, wgt loss.  no urine or stool incontinence.  no weakness or numbness of lower ext.\par no radiation of pain.  1 st day- 2days- took tylenol- helped her- decr pain

## 2018-09-25 NOTE — PLAN
[FreeTextEntry1] : ext monitor- holter\par back pain- ice, tiger balm- \par 4/18- had CPE w doctor in [place]

## 2018-09-28 ENCOUNTER — APPOINTMENT (OUTPATIENT)
Dept: INTERNAL MEDICINE | Facility: CLINIC | Age: 73
End: 2018-09-28
Payer: MEDICAID

## 2018-09-28 PROCEDURE — ZZZZZ: CPT

## 2018-10-17 ENCOUNTER — RESULT REVIEW (OUTPATIENT)
Age: 73
End: 2018-10-17

## 2018-10-30 ENCOUNTER — APPOINTMENT (OUTPATIENT)
Dept: CARDIOLOGY | Facility: CLINIC | Age: 73
End: 2018-10-30

## 2018-11-30 ENCOUNTER — OUTPATIENT (OUTPATIENT)
Dept: OUTPATIENT SERVICES | Facility: HOSPITAL | Age: 73
LOS: 1 days | End: 2018-11-30
Payer: MEDICAID

## 2018-11-30 ENCOUNTER — NON-APPOINTMENT (OUTPATIENT)
Age: 73
End: 2018-11-30

## 2018-11-30 ENCOUNTER — APPOINTMENT (OUTPATIENT)
Dept: CARDIOLOGY | Facility: CLINIC | Age: 73
End: 2018-11-30
Payer: MEDICAID

## 2018-11-30 VITALS
HEART RATE: 78 BPM | BODY MASS INDEX: 24.75 KG/M2 | WEIGHT: 145 LBS | DIASTOLIC BLOOD PRESSURE: 80 MMHG | HEIGHT: 64 IN | SYSTOLIC BLOOD PRESSURE: 140 MMHG | OXYGEN SATURATION: 98 %

## 2018-11-30 DIAGNOSIS — R00.2 PALPITATIONS: ICD-10-CM

## 2018-11-30 DIAGNOSIS — I49.3 VENTRICULAR PREMATURE DEPOLARIZATION: ICD-10-CM

## 2018-11-30 DIAGNOSIS — R94.31 ABNORMAL ELECTROCARDIOGRAM [ECG] [EKG]: ICD-10-CM

## 2018-11-30 PROCEDURE — 93000 ELECTROCARDIOGRAM COMPLETE: CPT | Mod: 59

## 2018-11-30 PROCEDURE — 93227 XTRNL ECG REC<48 HR R&I: CPT

## 2018-11-30 PROCEDURE — 99205 OFFICE O/P NEW HI 60 MIN: CPT

## 2018-12-01 NOTE — PHYSICAL EXAM
[General Appearance - Well Developed] : well developed [Normal Appearance] : normal appearance [Well Groomed] : well groomed [General Appearance - Well Nourished] : well nourished [No Deformities] : no deformities [General Appearance - In No Acute Distress] : no acute distress [Normal Conjunctiva] : the conjunctiva exhibited no abnormalities [Eyelids - No Xanthelasma] : the eyelids demonstrated no xanthelasmas [Normal Oral Mucosa] : normal oral mucosa [No Oral Pallor] : no oral pallor [No Oral Cyanosis] : no oral cyanosis [Normal Jugular Venous A Waves Present] : normal jugular venous A waves present [Normal Jugular Venous V Waves Present] : normal jugular venous V waves present [No Jugular Venous Gomes A Waves] : no jugular venous gomes A waves [Heart Rate And Rhythm] : heart rate and rhythm were normal [Heart Sounds] : normal S1 and S2 [Murmurs] : no murmurs present [Respiration, Rhythm And Depth] : normal respiratory rhythm and effort [Exaggerated Use Of Accessory Muscles For Inspiration] : no accessory muscle use [Auscultation Breath Sounds / Voice Sounds] : lungs were clear to auscultation bilaterally [Abdomen Soft] : soft [Abdomen Tenderness] : non-tender [Abdomen Mass (___ Cm)] : no abdominal mass palpated [Abnormal Walk] : normal gait [Gait - Sufficient For Exercise Testing] : the gait was sufficient for exercise testing [Nail Clubbing] : no clubbing of the fingernails [Cyanosis, Localized] : no localized cyanosis [Petechial Hemorrhages (___cm)] : no petechial hemorrhages [Skin Color & Pigmentation] : normal skin color and pigmentation [] : no rash [No Venous Stasis] : no venous stasis [Skin Lesions] : no skin lesions [No Skin Ulcers] : no skin ulcer [No Xanthoma] : no  xanthoma was observed [Oriented To Time, Place, And Person] : oriented to person, place, and time [Affect] : the affect was normal [Mood] : the mood was normal [No Anxiety] : not feeling anxious

## 2018-12-05 NOTE — REASON FOR VISIT
[FreeTextEntry1] : Ms. Cisneros presents for evaluation.  Accompanied by friend who spoke limited English.  She was previously admitted to [facility] in 9/2018 (see below).  Since discharge, she has continued to complain of symptomatic palpitations (chest pressure and throat tightness) and intermittent dizziness.  Episodes are self-limited, but can last for several minutes at a time, and occur every 1-2 days.  12-lead ECG demonstrate SR with bigeminy.  She has been placed on metoprolol with modest improvement in symptoms.\par \[facility] At this time will arrange for 48-hour Holter.  If PVC burden is high, will refer to EP.\par \par 73 y/o Mandarin speaking female with PMHx significant for HTN and HLD, \par presented with c/o chest discomfort and tightness. Patient reports that her \par symptoms started about 2-3 months ago, initially some chest discomfort and \par tightness (not pain per patient) in her midchest, radiating up her neck, \par associated with palpitation, dizziness and nausea, but no vomiting.  She went \par to see her cardiologist, and EKG showed NSR with bigeminy, no acute ST/T wave \par changes.  She had TTE done about 2-3 days ago, result still pending.  Patient \par had negative troponin X2.  EKG with NSR and bigeminy PVC, no acute ST/T wave \par abnormalities concerning for active ischemia.  CXR with mild cardiomegaly and \par no infiltrate/consolidation. She was given asa 325mg X1. She was initially \par admitted to CDU, had stress test done which did not show inducible \par infarction/ischemia, however, showed multiple PVCs with bigeminy and trigeminy \par during stress test and recovery. She was seen by cardiology and was recommended \par to admitted to telemetry for further workup and monitoring.  Patient was \par started on metoprolol - has PVCs, but no longer in bigeminy.  Due to ongoing \par chest pain, patient underwent LHC which showed luminal irregularities.  Patient \par was cleared by Cardiology for discharge.  Patient remains on ASA 81 (home med). \par  Patient's A1c was noted to be 6.1.  Patient will follow up with her Primary \par Care Doctor and Cardiologist within 1 week.  Her Cardiologist will refer her to \par an Electrophysiologist for ongoing follow-up. \par

## 2018-12-28 ENCOUNTER — APPOINTMENT (OUTPATIENT)
Dept: CARDIOLOGY | Facility: CLINIC | Age: 73
End: 2018-12-28
Payer: MEDICAID

## 2018-12-28 VITALS
BODY MASS INDEX: 25.4 KG/M2 | OXYGEN SATURATION: 97 % | DIASTOLIC BLOOD PRESSURE: 78 MMHG | HEART RATE: 58 BPM | WEIGHT: 148 LBS | SYSTOLIC BLOOD PRESSURE: 140 MMHG

## 2018-12-28 PROCEDURE — 93000 ELECTROCARDIOGRAM COMPLETE: CPT

## 2018-12-28 PROCEDURE — 99215 OFFICE O/P EST HI 40 MIN: CPT

## 2019-01-04 ENCOUNTER — APPOINTMENT (OUTPATIENT)
Dept: ELECTROPHYSIOLOGY | Facility: CLINIC | Age: 74
End: 2019-01-04
Payer: MEDICAID

## 2019-01-04 ENCOUNTER — NON-APPOINTMENT (OUTPATIENT)
Age: 74
End: 2019-01-04

## 2019-01-04 VITALS
HEIGHT: 64 IN | OXYGEN SATURATION: 95 % | DIASTOLIC BLOOD PRESSURE: 70 MMHG | HEART RATE: 53 BPM | SYSTOLIC BLOOD PRESSURE: 123 MMHG | WEIGHT: 149 LBS | BODY MASS INDEX: 25.44 KG/M2

## 2019-01-04 DIAGNOSIS — Z87.898 PERSONAL HISTORY OF OTHER SPECIFIED CONDITIONS: ICD-10-CM

## 2019-01-04 PROCEDURE — 99204 OFFICE O/P NEW MOD 45 MIN: CPT

## 2019-01-04 PROCEDURE — 93000 ELECTROCARDIOGRAM COMPLETE: CPT

## 2019-01-04 NOTE — REVIEW OF SYSTEMS
[Dyspnea on exertion] : dyspnea during exertion [Palpitations] : palpitations [Negative] : Heme/Lymph

## 2019-01-05 NOTE — DISCUSSION/SUMMARY
[FreeTextEntry1] : In summary, Diana Cisneros is a 74y/o woman with Hx of HTN, HLD, and palpitations with newly diagnosed frequent PVCs who presents today for initial evaluation. Admits doing well but does experience occasional episodes of chest discomfort and dyspnea on exertion. She was seen in the hospital back in September 2018 for symptoms and was noted to be in ventricular bigeminy at the time. Stress test was performed and negative for ischemia at that time. Symptoms likely to be secondary to PVCs. She underwent Holter monitoring in November which revealed 19% PVC burden (> 38k PVCs in 48 hours). Since that time, she has been maintained on metoprolol and feeling well with improvement in symptoms, less frequency of episodes. Denies chest pain, palpitations, SOB, syncope or near syncope. EKG today NSR without evidence of ventricular ectopy. Recommend cardiac MRI to r/o infiltrative process/sarcoidosis or ARVC. Discussed possibility of medication management vs PVC ablation if symptoms of PVCs continue and are bothersome. Risks, benefits, and alternatives discussed at length and patient wishes to avoid a procedure at this time. Will undergo cardiac MRI and continue use of metoprolol as prescribed. Will f/u with Cardiology and may consider routine echocardiograms as well as possible repeat Holter in future. \par \par Sincerely,\par \par Cedric Maldonado MD

## 2019-01-05 NOTE — HISTORY OF PRESENT ILLNESS
[FreeTextEntry1] : Tucker Tolentino MD\par \par I saw Diana Cisneros on January 4, 2019. As you know, she is a 72y/o woman with Hx of HTN, HLD, and palpitations with newly diagnosed frequent PVCs who presents today for initial evaluation. Admits doing well but does experience occasional episodes of chest discomfort and dyspnea on exertion. She was seen in the hospital back in September 2018 for symptoms and was noted to be in ventricular bigeminy at the time. Stress test was performed and negative for ischemia at that time. Symptoms likely to be secondary to PVCs. She underwent Holter monitoring in November which revealed 19% PVC burden (> 38k PVCs in 48 hours). Since that time, she has been maintained on metoprolol and feeling well with improvement in symptoms, less frequency of episodes. Denies chest pain, palpitations, SOB, syncope or near syncope.

## 2019-01-05 NOTE — REASON FOR VISIT
[Initial Evaluation] : an initial evaluation of [[employer] ] : provided by [employer]   [FreeTextEntry1] : [id number]

## 2019-01-06 NOTE — PHYSICAL EXAM
[General Appearance - Well Developed] : well developed [Normal Appearance] : normal appearance [Well Groomed] : well groomed [General Appearance - Well Nourished] : well nourished [No Deformities] : no deformities [General Appearance - In No Acute Distress] : no acute distress [Normal Conjunctiva] : the conjunctiva exhibited no abnormalities [Eyelids - No Xanthelasma] : the eyelids demonstrated no xanthelasmas [Normal Oral Mucosa] : normal oral mucosa [No Oral Pallor] : no oral pallor [No Oral Cyanosis] : no oral cyanosis [Normal Jugular Venous A Waves Present] : normal jugular venous A waves present [Normal Jugular Venous V Waves Present] : normal jugular venous V waves present [No Jugular Venous Gomes A Waves] : no jugular venous gomes A waves [Respiration, Rhythm And Depth] : normal respiratory rhythm and effort [Exaggerated Use Of Accessory Muscles For Inspiration] : no accessory muscle use [Auscultation Breath Sounds / Voice Sounds] : lungs were clear to auscultation bilaterally [Heart Rate And Rhythm] : heart rate and rhythm were normal [Heart Sounds] : normal S1 and S2 [Murmurs] : no murmurs present [Abdomen Soft] : soft [Abdomen Tenderness] : non-tender [Abdomen Mass (___ Cm)] : no abdominal mass palpated [Abnormal Walk] : normal gait [Gait - Sufficient For Exercise Testing] : the gait was sufficient for exercise testing [Nail Clubbing] : no clubbing of the fingernails [Cyanosis, Localized] : no localized cyanosis [Petechial Hemorrhages (___cm)] : no petechial hemorrhages [Skin Color & Pigmentation] : normal skin color and pigmentation [] : no rash [No Venous Stasis] : no venous stasis [Skin Lesions] : no skin lesions [No Skin Ulcers] : no skin ulcer [No Xanthoma] : no  xanthoma was observed [Oriented To Time, Place, And Person] : oriented to person, place, and time [Affect] : the affect was normal [Mood] : the mood was normal [No Anxiety] : not feeling anxious

## 2019-01-06 NOTE — REASON FOR VISIT
[FreeTextEntry1] : Ms. Cisneros presents for follow-up evaluation.  Accompanied by daughter who spoke English.  She was previously admitted to [facility] in 9/2018 (see below).  Since discharge, she has continued to complain of symptomatic palpitations (chest pressure and throat tightness) and intermittent dizziness.  Episodes are self-limited, but can last for several minutes at a time, and occur every 1-2 days.  12-lead ECG demonstrate SR with bigeminy.  She has been placed on metoprolol with modest improvement in symptoms.\par \par Admits doing well but does experience occasional episodes of chest discomfort and dyspnea on exertion. She was seen in the hospital back in September 2018 for symptoms and was noted to be in ventricular bigeminy at the time. Stress test was performed and negative for ischemia at that time. Symptoms likely to be secondary to PVCs. I had placed her on 48-hour Holter monitoring in November which revealed 19% PVC burden (> 38k PVCs in 48 hours). Since that time, she has been maintained on metoprolol and feeling stable with improvement in symptoms, less frequency of episodes. Denies chest pain, palpitations, SOB, syncope or near syncope. \[facility]  \[facility] Will refer to EP for evaluation for possible ablation.\[facility] \[facility] Cardiology Summary\[facility] Stress Test: 9/16/2018, * Exercise capacity: 10 METS, Excellent for age andgender.* Chest Pain: No chest pain with exercise.* Symptom: Fatigue.* HR Response: Appropriate.* BP Response: Appropriate.* Heart Rhythm: Sinus Rhythm - Frequent APD's, OccassionalVPD's - 63 BPM.* Q Waves: III, aVF.* Baseline ECG: Poor R wave progression.* ECG Changes: ST Depression:.5 mm upsloping in leads V4,V5, V6 started at 07:16 min of exercise at HR of 122 andpersisted 01:00 min into recovery.* Arrhythmia: Multiple PVCs during and stress test withbigeminy and trigeminy during stress test and recovery..* The left ventricle was normal in size. Normal myocardialperfusion scan, with no evidence of infarction orinducible ischemia.* Post-stress gated wall motion analysis was performed(LVEF > 70%;LVEDV = 51 ml.), revealing normal LV function.Conclusion:The left ventricle was normal in size. Normal myocardialperfusion scan, with no evidence of infarction orinducible ischemia.Arrhythmia: Multiple PVCs during and stress test withbigeminy and trigeminy during stress test and recovery. \par Echo: 9/18/2018, mild diastolic dysfunction, stage 1, mild mitral regurgitation LVEF 62%. \par Cardiac Cath: 9/17/2018, The coronary circulation is right dominant.LM: -- LM: Normal.LAD: -- LAD: Normal.CX: -- Circumflex: Normal.RCA: -- RCA: Normal. \par \par \par 71 y/o Mandarin speaking female with PMHx significant for HTN and HLD, \par presented with c/o chest discomfort and tightness. Patient reports that her \par symptoms started about 2-3 months ago, initially some chest discomfort and \par tightness (not pain per patient) in her midchest, radiating up her neck, \par associated with palpitation, dizziness and nausea, but no vomiting.  She went \par to see her cardiologist, and EKG showed NSR with bigeminy, no acute ST/T wave \par changes.  She had TTE done about 2-3 days ago, result still pending.  Patient \par had negative troponin X2.  EKG with NSR and bigeminy PVC, no acute ST/T wave \par abnormalities concerning for active ischemia.  CXR with mild cardiomegaly and \par no infiltrate/consolidation. She was given asa 325mg X1. She was initially \par admitted to CDU, had stress test done which did not show inducible \par infarction/ischemia, however, showed multiple PVCs with bigeminy and trigeminy \par during stress test and recovery. She was seen by cardiology and was recommended \par to admitted to telemetry for further workup and monitoring.  Patient was \par started on metoprolol - has PVCs, but no longer in bigeminy.  Due to ongoing \par chest pain, patient underwent LHC which showed luminal irregularities.  Patient \par was cleared by Cardiology for discharge.  Patient remains on ASA 81 (home med). \par  Patient's A1c was noted to be 6.1.  Patient will follow up with her Primary \par Care Doctor and Cardiologist within 1 week.  Her Cardiologist will refer her to \[facility] an Electrophysiologist for ongoing follow-up. \[facility]

## 2019-01-30 ENCOUNTER — FORM ENCOUNTER (OUTPATIENT)
Age: 74
End: 2019-01-30

## 2019-01-31 ENCOUNTER — APPOINTMENT (OUTPATIENT)
Dept: MRI IMAGING | Facility: HOSPITAL | Age: 74
End: 2019-01-31

## 2019-01-31 ENCOUNTER — OUTPATIENT (OUTPATIENT)
Dept: OUTPATIENT SERVICES | Facility: HOSPITAL | Age: 74
LOS: 1 days | End: 2019-01-31
Payer: MEDICARE

## 2019-01-31 DIAGNOSIS — I49.3 VENTRICULAR PREMATURE DEPOLARIZATION: ICD-10-CM

## 2019-01-31 DIAGNOSIS — Z00.00 ENCOUNTER FOR GENERAL ADULT MEDICAL EXAMINATION WITHOUT ABNORMAL FINDINGS: ICD-10-CM

## 2019-01-31 PROCEDURE — 75561 CARDIAC MRI FOR MORPH W/DYE: CPT

## 2019-01-31 PROCEDURE — A9585: CPT

## 2019-01-31 PROCEDURE — 75561 CARDIAC MRI FOR MORPH W/DYE: CPT | Mod: 26

## 2019-02-21 ENCOUNTER — MESSAGE (OUTPATIENT)
Age: 74
End: 2019-02-21

## 2019-03-01 ENCOUNTER — APPOINTMENT (OUTPATIENT)
Dept: CARDIOLOGY | Facility: CLINIC | Age: 74
End: 2019-03-01
Payer: MEDICAID

## 2019-03-01 ENCOUNTER — NON-APPOINTMENT (OUTPATIENT)
Age: 74
End: 2019-03-01

## 2019-03-01 VITALS
OXYGEN SATURATION: 96 % | DIASTOLIC BLOOD PRESSURE: 71 MMHG | HEART RATE: 64 BPM | BODY MASS INDEX: 25.66 KG/M2 | HEIGHT: 64 IN | SYSTOLIC BLOOD PRESSURE: 124 MMHG | WEIGHT: 150.31 LBS

## 2019-03-01 DIAGNOSIS — M54.5 LOW BACK PAIN: ICD-10-CM

## 2019-03-01 PROCEDURE — 99215 OFFICE O/P EST HI 40 MIN: CPT

## 2019-03-01 PROCEDURE — 93000 ELECTROCARDIOGRAM COMPLETE: CPT

## 2019-04-01 ENCOUNTER — OUTPATIENT (OUTPATIENT)
Dept: OUTPATIENT SERVICES | Facility: HOSPITAL | Age: 74
LOS: 1 days | End: 2019-04-01
Payer: MEDICARE

## 2019-04-09 ENCOUNTER — OTHER (OUTPATIENT)
Age: 74
End: 2019-04-09

## 2019-04-09 ENCOUNTER — APPOINTMENT (OUTPATIENT)
Dept: ELECTROPHYSIOLOGY | Facility: CLINIC | Age: 74
End: 2019-04-09
Payer: MEDICARE

## 2019-04-09 PROCEDURE — 36415 COLL VENOUS BLD VENIPUNCTURE: CPT

## 2019-04-16 LAB
ALBUMIN SERPL ELPH-MCNC: 4.6 G/DL
ALP BLD-CCNC: 67 U/L
ALT SERPL-CCNC: 46 U/L
ANION GAP SERPL CALC-SCNC: 16 MMOL/L
AST SERPL-CCNC: 34 U/L
BASOPHILS # BLD AUTO: 0.03 K/UL
BASOPHILS NFR BLD AUTO: 0.6 %
BILIRUB SERPL-MCNC: 0.4 MG/DL
BUN SERPL-MCNC: 12 MG/DL
CALCIUM SERPL-MCNC: 9.7 MG/DL
CHLORIDE SERPL-SCNC: 105 MMOL/L
CO2 SERPL-SCNC: 22 MMOL/L
CREAT SERPL-MCNC: 0.7 MG/DL
EOSINOPHIL # BLD AUTO: 0.16 K/UL
EOSINOPHIL NFR BLD AUTO: 3.2 %
GLUCOSE SERPL-MCNC: 86 MG/DL
HCT VFR BLD CALC: 42.5 %
HGB BLD-MCNC: 13.2 G/DL
IMM GRANULOCYTES NFR BLD AUTO: 0.4 %
LYMPHOCYTES # BLD AUTO: 1.6 K/UL
LYMPHOCYTES NFR BLD AUTO: 32.2 %
MAN DIFF?: NORMAL
MCHC RBC-ENTMCNC: 28.6 PG
MCHC RBC-ENTMCNC: 31.1 GM/DL
MCV RBC AUTO: 92 FL
MONOCYTES # BLD AUTO: 0.59 K/UL
MONOCYTES NFR BLD AUTO: 11.9 %
NEUTROPHILS # BLD AUTO: 2.57 K/UL
NEUTROPHILS NFR BLD AUTO: 51.7 %
PLATELET # BLD AUTO: 277 K/UL
POTASSIUM SERPL-SCNC: 4.1 MMOL/L
PROT SERPL-MCNC: 7.8 G/DL
RBC # BLD: 4.62 M/UL
RBC # FLD: 12.3 %
SODIUM SERPL-SCNC: 142 MMOL/L
WBC # FLD AUTO: 4.97 K/UL

## 2019-04-17 ENCOUNTER — INPATIENT (INPATIENT)
Facility: HOSPITAL | Age: 74
LOS: 0 days | Discharge: HOME | End: 2019-04-18
Attending: INTERNAL MEDICINE | Admitting: INTERNAL MEDICINE
Payer: MEDICARE

## 2019-04-17 VITALS
HEART RATE: 62 BPM | SYSTOLIC BLOOD PRESSURE: 124 MMHG | DIASTOLIC BLOOD PRESSURE: 70 MMHG | OXYGEN SATURATION: 97 % | TEMPERATURE: 98 F | RESPIRATION RATE: 17 BRPM

## 2019-04-17 DIAGNOSIS — I48.3 TYPICAL ATRIAL FLUTTER: ICD-10-CM

## 2019-04-17 LAB
BLD GP AB SCN SERPL QL: NEGATIVE — SIGNIFICANT CHANGE UP
GLUCOSE BLDC GLUCOMTR-MCNC: 87 MG/DL — SIGNIFICANT CHANGE UP (ref 70–99)
RH IG SCN BLD-IMP: POSITIVE — SIGNIFICANT CHANGE UP

## 2019-04-17 PROCEDURE — 93623 PRGRMD STIMJ&PACG IV RX NFS: CPT | Mod: 26

## 2019-04-17 PROCEDURE — 93010 ELECTROCARDIOGRAM REPORT: CPT | Mod: 77

## 2019-04-17 PROCEDURE — 93010 ELECTROCARDIOGRAM REPORT: CPT

## 2019-04-17 PROCEDURE — 93654 COMPRE EP EVAL TX VT: CPT

## 2019-04-17 RX ORDER — AMLODIPINE BESYLATE 2.5 MG/1
10 TABLET ORAL DAILY
Qty: 0 | Refills: 0 | Status: DISCONTINUED | OUTPATIENT
Start: 2019-04-18 | End: 2019-04-18

## 2019-04-17 RX ORDER — ACETAMINOPHEN 500 MG
650 TABLET ORAL ONCE
Qty: 0 | Refills: 0 | Status: COMPLETED | OUTPATIENT
Start: 2019-04-17 | End: 2019-04-17

## 2019-04-17 RX ORDER — INFLUENZA VIRUS VACCINE 15; 15; 15; 15 UG/.5ML; UG/.5ML; UG/.5ML; UG/.5ML
0.5 SUSPENSION INTRAMUSCULAR ONCE
Qty: 0 | Refills: 0 | Status: DISCONTINUED | OUTPATIENT
Start: 2019-04-17 | End: 2019-04-18

## 2019-04-17 RX ORDER — SODIUM CHLORIDE 9 MG/ML
3 INJECTION INTRAMUSCULAR; INTRAVENOUS; SUBCUTANEOUS EVERY 8 HOURS
Qty: 0 | Refills: 0 | Status: DISCONTINUED | OUTPATIENT
Start: 2019-04-17 | End: 2019-04-18

## 2019-04-17 RX ORDER — ASPIRIN/CALCIUM CARB/MAGNESIUM 324 MG
81 TABLET ORAL DAILY
Qty: 0 | Refills: 0 | Status: DISCONTINUED | OUTPATIENT
Start: 2019-04-17 | End: 2019-04-18

## 2019-04-17 RX ORDER — SIMVASTATIN 20 MG/1
20 TABLET, FILM COATED ORAL AT BEDTIME
Qty: 0 | Refills: 0 | Status: DISCONTINUED | OUTPATIENT
Start: 2019-04-17 | End: 2019-04-18

## 2019-04-17 RX ADMIN — Medication 650 MILLIGRAM(S): at 20:21

## 2019-04-17 RX ADMIN — Medication 650 MILLIGRAM(S): at 21:00

## 2019-04-17 RX ADMIN — SODIUM CHLORIDE 3 MILLILITER(S): 9 INJECTION INTRAMUSCULAR; INTRAVENOUS; SUBCUTANEOUS at 21:13

## 2019-04-17 RX ADMIN — SIMVASTATIN 20 MILLIGRAM(S): 20 TABLET, FILM COATED ORAL at 22:04

## 2019-04-17 RX ADMIN — Medication 81 MILLIGRAM(S): at 22:04

## 2019-04-17 NOTE — CHART NOTE - NSCHARTNOTEFT_GEN_A_CORE
Called by RN for patient complaining of headache. Patient status post PVC ablation via RFA. Patient without any neurologic deficit, denies visual change or weakness. Physical exam without any focal deficits. Tylenol ordered for pain control.    RFA site checked and is clean, dry, and intact. Pulses present proximal and distal to access site. Will continue to monitor.

## 2019-04-17 NOTE — H&P CARDIOLOGY - HISTORY OF PRESENT ILLNESS
74 y/o F w/ PMH of HTN, HLD and symptomatic PVCs presents for PVC ablation. Pt experiences palpations, chest discomfort, fatigue and dyspnea on exertion during episodes of frequent PVCs. Pt had a cardiac catheretization which showed normal coronaries and echocardiogram was normal with EF 62%. Pt also had a Cardiac MRI which showed no signs of ischemia, scar or fibrosis. Pt's Holter monitor revealed a 19% PVC burden and she was started on metoprolol which has helped reduce her symptoms. Pt denies N/V/D, fevers, chills, cough, syncope, orthopnea, nocturnal paroxysmal dyspnea, edema, cyanosis, heart murmurs, varicosities, phlebitis, claudication.

## 2019-04-17 NOTE — H&P CARDIOLOGY - RS GEN PE MLT RESP DETAILS PC
breath sounds equal/airway patent/respirations non-labored/clear to auscultation bilaterally/good air movement/no chest wall tenderness

## 2019-04-17 NOTE — CHART NOTE - NSCHARTNOTEFT_GEN_A_CORE
Type of Procedure: Ablation of premature ventricular complexes  Licensed independent practitioner: Cedric Maldonado MD  Assistant: none  Description of procedure: right femoral artery access was obtained and heparin was administered.  PVCs were mapped to the AMC and ablated using endocardial activation time.  Post ablation PVCs were suppressed despite up to 3 ventricular extrastimuli and dobutamine withdrawal from 40 mkm.   Findings of procedure: suppression of PVCs with RF ablation  Estimated blood loss: < 10 cc  Specimen removed: none  Preoperative Dx: premature ventricular complexes  Postoperative Dx: premature ventricular complexes  Complications: none  Anesthesia type: local with sedation  No heparin for 24 hours and then reassess.    Cedric Maldonado MD.

## 2019-04-18 ENCOUNTER — TRANSCRIPTION ENCOUNTER (OUTPATIENT)
Age: 74
End: 2019-04-18

## 2019-04-18 VITALS
OXYGEN SATURATION: 97 % | RESPIRATION RATE: 17 BRPM | DIASTOLIC BLOOD PRESSURE: 76 MMHG | HEART RATE: 71 BPM | SYSTOLIC BLOOD PRESSURE: 127 MMHG | TEMPERATURE: 98 F

## 2019-04-18 LAB
ANION GAP SERPL CALC-SCNC: 11 MMO/L — SIGNIFICANT CHANGE UP (ref 7–14)
BUN SERPL-MCNC: 9 MG/DL — SIGNIFICANT CHANGE UP (ref 7–23)
CALCIUM SERPL-MCNC: 8.7 MG/DL — SIGNIFICANT CHANGE UP (ref 8.4–10.5)
CHLORIDE SERPL-SCNC: 106 MMOL/L — SIGNIFICANT CHANGE UP (ref 98–107)
CO2 SERPL-SCNC: 25 MMOL/L — SIGNIFICANT CHANGE UP (ref 22–31)
CREAT SERPL-MCNC: 0.62 MG/DL — SIGNIFICANT CHANGE UP (ref 0.5–1.3)
GLUCOSE SERPL-MCNC: 92 MG/DL — SIGNIFICANT CHANGE UP (ref 70–99)
HCT VFR BLD CALC: 40.2 % — SIGNIFICANT CHANGE UP (ref 34.5–45)
HGB BLD-MCNC: 12.6 G/DL — SIGNIFICANT CHANGE UP (ref 11.5–15.5)
MAGNESIUM SERPL-MCNC: 2.2 MG/DL — SIGNIFICANT CHANGE UP (ref 1.6–2.6)
MCHC RBC-ENTMCNC: 28.5 PG — SIGNIFICANT CHANGE UP (ref 27–34)
MCHC RBC-ENTMCNC: 31.3 % — LOW (ref 32–36)
MCV RBC AUTO: 91 FL — SIGNIFICANT CHANGE UP (ref 80–100)
NRBC # FLD: 0 K/UL — SIGNIFICANT CHANGE UP (ref 0–0)
PHOSPHATE SERPL-MCNC: 3.4 MG/DL — SIGNIFICANT CHANGE UP (ref 2.5–4.5)
PLATELET # BLD AUTO: 228 K/UL — SIGNIFICANT CHANGE UP (ref 150–400)
PMV BLD: 10.1 FL — SIGNIFICANT CHANGE UP (ref 7–13)
POTASSIUM SERPL-MCNC: 3.9 MMOL/L — SIGNIFICANT CHANGE UP (ref 3.5–5.3)
POTASSIUM SERPL-SCNC: 3.9 MMOL/L — SIGNIFICANT CHANGE UP (ref 3.5–5.3)
RBC # BLD: 4.42 M/UL — SIGNIFICANT CHANGE UP (ref 3.8–5.2)
RBC # FLD: 12.2 % — SIGNIFICANT CHANGE UP (ref 10.3–14.5)
SODIUM SERPL-SCNC: 142 MMOL/L — SIGNIFICANT CHANGE UP (ref 135–145)
WBC # BLD: 4.38 K/UL — SIGNIFICANT CHANGE UP (ref 3.8–10.5)
WBC # FLD AUTO: 4.38 K/UL — SIGNIFICANT CHANGE UP (ref 3.8–10.5)

## 2019-04-18 PROCEDURE — 99238 HOSP IP/OBS DSCHRG MGMT 30/<: CPT

## 2019-04-18 PROCEDURE — 93010 ELECTROCARDIOGRAM REPORT: CPT

## 2019-04-18 RX ADMIN — AMLODIPINE BESYLATE 10 MILLIGRAM(S): 2.5 TABLET ORAL at 06:16

## 2019-04-18 RX ADMIN — Medication 81 MILLIGRAM(S): at 11:48

## 2019-04-18 RX ADMIN — SODIUM CHLORIDE 3 MILLILITER(S): 9 INJECTION INTRAMUSCULAR; INTRAVENOUS; SUBCUTANEOUS at 13:01

## 2019-04-18 RX ADMIN — SODIUM CHLORIDE 3 MILLILITER(S): 9 INJECTION INTRAMUSCULAR; INTRAVENOUS; SUBCUTANEOUS at 06:16

## 2019-04-18 NOTE — PROGRESS NOTE ADULT - ASSESSMENT
72 y/o F Mandarin speaking w/ PMH of HTN, HLD and symptomatic PVCs presents for PVC ablation s/p successful PVC ablation on 4/17.  Post ablation instruction provided in Mandarin.  Patient stated understanding.  -Follow up with EP Dr. Maldonado on 5/3 at 2pm  -discontinue Metoprolol, patient has normal LVEF  -Stable for discharge today

## 2019-04-18 NOTE — DISCHARGE NOTE NURSING/CASE MANAGEMENT/SOCIAL WORK - NSDCDPATPORTLINK_GEN_ALL_CORE
You can access the [facility] Patient Portal, offered by [facility], by registering with the following website: [email or web address]

## 2019-04-18 NOTE — DISCHARGE NOTE PROVIDER - HOSPITAL COURSE
74 y/o F Mandarin speaking w/ PMH of HTN, HLD and symptomatic PVCs presents for PVC ablation s/p successful PVC ablation on 4/17.  Post ablation instruction provided in Mandarin.  Patient stated understanding.    -Follow up with EP Dr. Maldonado on 5/3 at 2pm    -discontinue Metoprolol, patient has normal LVEF    -Stable for discharge today    Patient is hemodynamically stable and without complaints and patient is ready for discharge.  Case discussed and medications reviewed with PMD.  Agreed with above.

## 2019-04-18 NOTE — DISCHARGE NOTE PROVIDER - CARE PROVIDER_API CALL
Cedric Maldonado (MD)  Cardiac Electrophysiology; Cardiovascular Disease; Internal Medicine  [address] [address] [place] [address], [address] [place] [address]  Phone: [phone]  Fax: [phone]  Follow Up Time: Routine

## 2019-04-18 NOTE — DISCHARGE NOTE PROVIDER - NSDCCPCAREPLAN_GEN_ALL_CORE_FT
PRINCIPAL DISCHARGE DIAGNOSIS  Diagnosis: Symptomatic PVCs  Assessment and Plan of Treatment: s/p PVC ablation  Patient converted to sinus rhythm.  Pateint had liliam caric event to 44 overnight and resolved chest pressure also resolved.  Metoprolol was discontinued

## 2019-04-18 NOTE — PROGRESS NOTE ADULT - SUBJECTIVE AND OBJECTIVE BOX
Patient is a 73y old  Female who presents with a chief complaint of   s/p PVC ablation.  Patient denies CP, SOB or palpitations.   PAST MEDICAL & SURGICAL HISTORY:  Symptomatic PVCs  Hyperlipidemia  Hypertension  No significant past surgical history      MEDICATIONS  (STANDING):  amLODIPine   Tablet 10 milliGRAM(s) Oral daily  aspirin enteric coated 81 milliGRAM(s) Oral daily  influenza   Vaccine 0.5 milliLiter(s) IntraMuscular once  simvastatin 20 milliGRAM(s) Oral at bedtime  sodium chloride 0.9% lock flush 3 milliLiter(s) IV Push every 8 hours    MEDICATIONS  (PRN):            Vital Signs Last 24 Hrs  T(C): 36.8 (18 Apr 2019 06:50), Max: 36.8 (17 Apr 2019 19:56)  T(F): 98.2 (18 Apr 2019 06:50), Max: 98.2 (17 Apr 2019 19:56)  HR: 60 (18 Apr 2019 06:50) (60 - 95)  BP: 117/63 (18 Apr 2019 06:50) (101/66 - 127/71)  BP(mean): --  RR: 17 (18 Apr 2019 06:50) (17 - 18)  SpO2: 97% (18 Apr 2019 06:50) (97% - 100%)            INTERPRETATION OF TELEMETRY:  SR 60's transient liliam to 40's during night     ECG: NSR        LABS:                        12.6   4.38  )-----------( 228      ( 18 Apr 2019 06:00 )             40.2     04-18    142  |  106  |  9   ----------------------------<  92  3.9   |  25  |  0.62    Ca    8.7      18 Apr 2019 06:00  Phos  3.4     04-18  Mg     2.2     04-18                BNP  RADIOLOGY & ADDITIONAL STUDIES:      PHYSICAL EXAM:    GENERAL: In no apparent distress, well nourished, and hydrated.  NECK: Supple and normal thyroid.  No JVD or carotid bruit.  Carotid pulse is 2+ bilaterally.  HEART: Regular rate and rhythm; No murmurs, rubs, or gallops.  PULMONARY: Clear to auscultation and perfusion.  No rales, wheezing, or rhonchi bilaterally.  ABDOMEN: Soft, Nontender, Nondistended; Bowel sounds present  EXTREMITIES:  2+ Peripheral Pulses, No clubbing, cyanosis, or edema; R groin site no bleeding or hamatoma  NEUROLOGICAL: Grossly nonfocal

## 2019-04-19 ENCOUNTER — OTHER (OUTPATIENT)
Age: 74
End: 2019-04-19

## 2019-04-28 ENCOUNTER — EMERGENCY (EMERGENCY)
Facility: HOSPITAL | Age: 74
LOS: 1 days | Discharge: HOME | End: 2019-04-28
Attending: EMERGENCY MEDICINE | Admitting: EMERGENCY MEDICINE
Payer: MEDICARE

## 2019-04-28 VITALS
SYSTOLIC BLOOD PRESSURE: 155 MMHG | TEMPERATURE: 99 F | RESPIRATION RATE: 16 BRPM | HEART RATE: 89 BPM | OXYGEN SATURATION: 99 % | DIASTOLIC BLOOD PRESSURE: 77 MMHG

## 2019-04-28 LAB
ALBUMIN SERPL ELPH-MCNC: 4.6 G/DL — SIGNIFICANT CHANGE UP (ref 3.3–5)
ALP SERPL-CCNC: 75 U/L — SIGNIFICANT CHANGE UP (ref 40–120)
ALT FLD-CCNC: 41 U/L — HIGH (ref 4–33)
ANION GAP SERPL CALC-SCNC: 15 MMO/L — HIGH (ref 7–14)
AST SERPL-CCNC: 33 U/L — HIGH (ref 4–32)
BILIRUB SERPL-MCNC: 0.4 MG/DL — SIGNIFICANT CHANGE UP (ref 0.2–1.2)
BUN SERPL-MCNC: 9 MG/DL — SIGNIFICANT CHANGE UP (ref 7–23)
CALCIUM SERPL-MCNC: 10 MG/DL — SIGNIFICANT CHANGE UP (ref 8.4–10.5)
CHLORIDE SERPL-SCNC: 101 MMOL/L — SIGNIFICANT CHANGE UP (ref 98–107)
CO2 SERPL-SCNC: 26 MMOL/L — SIGNIFICANT CHANGE UP (ref 22–31)
CREAT SERPL-MCNC: 0.66 MG/DL — SIGNIFICANT CHANGE UP (ref 0.5–1.3)
GLUCOSE SERPL-MCNC: 86 MG/DL — SIGNIFICANT CHANGE UP (ref 70–99)
HCT VFR BLD CALC: 46.6 % — HIGH (ref 34.5–45)
HGB BLD-MCNC: 14.5 G/DL — SIGNIFICANT CHANGE UP (ref 11.5–15.5)
MAGNESIUM SERPL-MCNC: 2.3 MG/DL — SIGNIFICANT CHANGE UP (ref 1.6–2.6)
MCHC RBC-ENTMCNC: 28.3 PG — SIGNIFICANT CHANGE UP (ref 27–34)
MCHC RBC-ENTMCNC: 31.1 % — LOW (ref 32–36)
MCV RBC AUTO: 91 FL — SIGNIFICANT CHANGE UP (ref 80–100)
NRBC # FLD: 0.03 K/UL — SIGNIFICANT CHANGE UP (ref 0–0)
PHOSPHATE SERPL-MCNC: 3.7 MG/DL — SIGNIFICANT CHANGE UP (ref 2.5–4.5)
PLATELET # BLD AUTO: 331 K/UL — SIGNIFICANT CHANGE UP (ref 150–400)
PMV BLD: 9.8 FL — SIGNIFICANT CHANGE UP (ref 7–13)
POTASSIUM SERPL-MCNC: 3.9 MMOL/L — SIGNIFICANT CHANGE UP (ref 3.5–5.3)
POTASSIUM SERPL-SCNC: 3.9 MMOL/L — SIGNIFICANT CHANGE UP (ref 3.5–5.3)
PROT SERPL-MCNC: 8.4 G/DL — HIGH (ref 6–8.3)
RBC # BLD: 5.12 M/UL — SIGNIFICANT CHANGE UP (ref 3.8–5.2)
RBC # FLD: 12.1 % — SIGNIFICANT CHANGE UP (ref 10.3–14.5)
SODIUM SERPL-SCNC: 142 MMOL/L — SIGNIFICANT CHANGE UP (ref 135–145)
TROPONIN T, HIGH SENSITIVITY: 8 NG/L — SIGNIFICANT CHANGE UP (ref ?–14)
TROPONIN T, HIGH SENSITIVITY: 9 NG/L — SIGNIFICANT CHANGE UP (ref ?–14)
WBC # BLD: 5.82 K/UL — SIGNIFICANT CHANGE UP (ref 3.8–10.5)
WBC # FLD AUTO: 5.82 K/UL — SIGNIFICANT CHANGE UP (ref 3.8–10.5)

## 2019-04-28 PROCEDURE — 99283 EMERGENCY DEPT VISIT LOW MDM: CPT

## 2019-04-28 PROCEDURE — 99220: CPT | Mod: GC

## 2019-04-28 PROCEDURE — 93010 ELECTROCARDIOGRAM REPORT: CPT | Mod: 59,GC

## 2019-04-28 RX ORDER — ASPIRIN/CALCIUM CARB/MAGNESIUM 324 MG
81 TABLET ORAL DAILY
Qty: 0 | Refills: 0 | Status: DISCONTINUED | OUTPATIENT
Start: 2019-04-28 | End: 2019-05-02

## 2019-04-28 RX ORDER — AMLODIPINE BESYLATE 2.5 MG/1
10 TABLET ORAL DAILY
Qty: 0 | Refills: 0 | Status: DISCONTINUED | OUTPATIENT
Start: 2019-04-28 | End: 2019-05-02

## 2019-04-28 RX ORDER — SIMVASTATIN 20 MG/1
20 TABLET, FILM COATED ORAL AT BEDTIME
Qty: 0 | Refills: 0 | Status: DISCONTINUED | OUTPATIENT
Start: 2019-04-28 | End: 2019-05-02

## 2019-04-28 RX ADMIN — SIMVASTATIN 20 MILLIGRAM(S): 20 TABLET, FILM COATED ORAL at 21:35

## 2019-04-28 NOTE — CONSULT NOTE ADULT - SUBJECTIVE AND OBJECTIVE BOX
Date of Admission: 2019    CHIEF COMPLAINT: GLOVER and palpitations    HISTORY OF PRESENT ILLNESS: 73F with HTN, HLD, s/p recent PVC ablation (2019) presents with GLOVER and palpitations. Patient states through Mandarin  that 3 days after ablation she started to experience dyspnea with exertion, mostly going up stairs and palpitations. She states that these symptoms are the same as before her ablation but not as intense. These symptoms have occurred daily and can last up to 4 hours. She also reports associated tinnitus. At discharge metoprolol was discontinued. In ER EKG with NSR, unchanged from previous, no VPCs with stable VS.       Allergies    penicillin (Anaphylaxis)    Intolerances    	    MEDICATIONS:    PAST MEDICAL & SURGICAL HISTORY:  Symptomatic PVCs  Hyperlipidemia  Hypertension  No significant past surgical history      FAMILY HISTORY:  Family history of hypertension (Father, Mother)      SOCIAL HISTORY:    Smoker  denies  Alcohol denies  Drugs denies      REVIEW OF SYSTEMS:  See HPI. Otherwise, 10 point ROS done and otherwise negative.    PHYSICAL EXAM:  T(C): 37.1 (19 @ 14:16), Max: 37.1 (19 @ 12:13)  HR: 70 (19 @ 14:16) (70 - 89)  BP: 157/65 (19 @ 14:16) (155/77 - 157/65)  RR: 16 (19 @ 14:16) (16 - 16)  SpO2: 100% (19 @ 14:16) (99% - 100%)  Wt(kg): --  I&O's Summary      Appearance: NAD, skin W & D but mildly anxious.	  HEENT:   Normal oral mucosa, PERRL, EOMI	  Cardiovascular: nl S1S2, no M/R/C  Respiratory: Lungs clear to auscultation	  Psychiatry: A & O x 3, Mood & affect appropriate  Gastrointestinal:  Soft, Non-tender, + BS	  Skin: No rashes, No ecchymoses, No cyanosis	  Neurologic: Non-focal  Extremities: Normal range of motion, No clubbing, cyanosis or edema          LABS:	 	                        14.5   5.82  )-----------( 331      ( 2019 14:29 )             46.6           142  |  101  |  9   ----------------------------<  86  3.9   |  26  |  0.66    Ca    10.0      2019 14:29  Phos  3.7       Mg     2.3         TPro  8.4<H>  /  Alb  4.6  /  TBili  0.4  /  DBili  x   /  AST  33<H>  /  ALT  41<H>  /  AlkPhos  75      CARDIAC MARKERS:  Troponin T, High Sensitivity: 8 ng/L (19 @ 14:29)      TELEMETRY: NSR  	    ECG:  	NSR      PREVIOUS DIAGNOSTIC TESTING:      [ ]  Catheterization:  [facility]  Department of Cardiology  [address] [place] [id number]  [phone]  Cath Lab Report -- Comprehensive Report  Patient: RAZA LLAMAS  Study date: 2018  Account number: [id number]  MR number: [id number]  : 1945  Gender: Female  Race:   Case Physician(s):  Linden Viramontes M.D.  Fellow:  Steven Langford M.D.  Referring Physician:  Linden Shelton M.D.  INDICATIONS: Unstable angina - CCS3.  HISTORY: There was no prior cardiac history. The patient has hypertension.  PROCEDURE:  --  Left heart catheterization.  --  Left coronary angiography.  --  Right coronary angiography.  TECHNIQUE: The risks and alternatives of the procedures and conscious  sedation were explained to thepatient and informed consent was obtained.  Cardiac catheterization performed electively.  Local anesthetic given. Right radial artery access. Left heart  catheterization. Left coronary artery angiography. The vessel was injected  utilizing a catheter. Right coronary artery angiography. The vessel was  injected utilizing a catheter. RADIATION EXPOSURE: 4.5 min.  CONTRAST GIVEN: Omnipaque 19 ml.  MEDICATIONS GIVEN: Fentanyl, 25 mcg, IV. Midazolam, 1 mg, IV.  CORONARY VESSELS: The coronary circulation is right dominant.  LM:   --  LM: Normal.  LAD:   --  LAD: Normal.  CX:   --  Circumflex: Normal.  RCA:   --  RCA: Normal.  COMPLICATIONS: There were no complications.  DIAGNOSTIC RECOMMENDATIONS: The patient should continue with the present  medications.  Prepared and signed by  Linden Viramontes M.D.  Signed 2018 10:34:28  HEMODYNAMIC TABLES  Pressures:  Baseline  Pressures:  - HR: 72  Pressures:  - Rhythm:  Pressures:  -- Aortic Pressure (S/D/M): 120/54/76  Pressures:  -- Left Ventricle (s/edp): 118/8/--  Outputs:  Baseline  Outputs:  -- CALCULATIONS[age]  Outputs:  -- CALCULATIONS: Body Surface Area: 1.68  Outputs:  -- CALCULATIONS: Height in cm: 162.00  Outputs:  -- CALCULATIONS: Sex: Female  Outputs:  -- CALCULATIONS: Weight in k.00    [ ] Stress Test:  	    PATIENT: RAZA LLAMAS  : 1945   AGE: 72 (F)   MR#: [id number]  STUDY DATE: 2018  LOCATION: O/P  REF. PHYSICIAN(S): Luis Armando De La Cruz MD  FELLOW: Chano RO NP  ------------------------------------------------------------------------  TYPE OF TEST: Rest/Stress SESTAMIBI  INDICATION: Chest pain, unspecified (R07.9)  ------------------------------------------------------------------------  HISTORY:  CARDIAC HISTORY: 72 years old female with HTN, HLD  presents for ischemic evaluation for chest pain.  RISK FACTORS: Elevated Cholesterol, Hypertension  MEDICATIONS: Amlodipine, Simvastatin  ------------------------------------------------------------------------  BASELINE ELECTROCARDIOGRAM:  Rhythm: Sinus Rhythm - Frequent APD's,Occassional VPD's -  63 BPM  Conduction Defect: None  Q Waves: III, aVF  ST: Poor R wave progression  ------------------------------------------------------------------------  EXERCISE RESULTS:  TIME      SPEED    GRADE  HR      BP  Baseline  Standing   N/A  63  122/72  03:00     1.7 MPH    10%  99  154/89  06:00     2.5 MPH    12% 113  166/86  08:00     3.4 MPH    14% 134  01:00     Recovery       115  152/86  03:00     Recovery        93  145/68  07:31     Recovery        90  139/70  ------------------------------------------------------------------------  Protocol: Cedric   Exercise Duration: 8: 00 Min  HR: Baseline HR: 63 bpm   Peak HR: 134 bpm (90% of MPHR)  MPHR: 149 bpm   85% of MPHR: 127 bpm  BP: Baseline BP: 122/72 mmHg   Peak BP: 166/86 mmHg   Peak  RPP: 38460 (Rate Pressure Product)  Last Caffeine intake: 12 hrs  Performance: 10 METS, Excellent for age and gender.  Comments: The patient walked on treadmill, target heart  rate was achieved.  ------------------------------------------------------------------------  SYMPTOMS/FINDINGS:  Symptoms: Fatigue  Chest pain: No chest pain with exercise  ------------------------------------------------------------------------  ECG ABNORMALITIES DURING/AFTER STRESS:   ST Changes:ST Depression: .5 mm upsloping in leads V4,  V5, V6 started at 07:16 min of exercise at HR of 122 and  persisted 01:00 min into recovery.  ------------------------------------------------------------------------  NEW ARRHYTHMIAS DEVELOPED DURING/AFTER STRESS:  Multiple PVCs during and stress test with bigeminy and  trigeminy during stress test and recovery.  ------------------------------------------------------------------------  STRESS TEST IMPRESSIONS:  Exercise capacity: 10 METS, Excellent for age and gender.  Chest Pain: No chest pain with exercise.  Symptom: Fatigue.  HR Response: Appropriate.  BP Response: Appropriate.  Heart Rhythm: Sinus Rhythm - Frequent APD's, Occassional  VPD's - 63 BPM.  Q Waves: III, aVF.  Baseline ECG: Poor R wave progression.  ECG Changes: ST Depression: .5 mm upsloping in leads V4,  V5, V6 started at 07:16 min of exercise at HR of 122 and  persisted 01:00 min into recovery.  Arrhythmia: Multiple PVCs during and stress test with  bigeminy and trigeminy during stresstest and recovery. .  ------------------------------------------------------------------------  PROCEDURE:  7 mCi of Tc99m Sestamibi were injected intravenously at  rest. Approximately 70 minutes later, tomographic images  were obtained in a 180 degree arc from right anterior  oblique to left anterior oblique with 64 stops. At a  separate time, 18.3 mCi of Tc99m Sestamibi were injected  intravenously during stress protocol. Approximately 35  minute(s) later, tomographic images were obtained in a 180  degree arc from right anterior oblique to left anterior  oblique with 64 stops. The tomographic slices were  reconstructed in 3 orthogonal planes (short axis,  horizontal long axis and vertical long axis).  Interpretation was performed both by visual and  quantitative analysis.  Rest and stress images were acquired using CZT-based  system with pinhole collimation (Discovery  c, Subblime), and reconstructed using MLEM algorithm.  Images were re-acquired with the patient in a prone  position.  ------------------------------------------------------------------------  NUCLEAR FINDINGS:  The left ventricle was normal in size. Normal myocardial  perfusion scan, with no evidence of infarction or  inducible ischemia.  ------------------------------------------------------------------------  GATED ANALYSIS:  Post-stress gated wall motion analysis was performed (LVEF  > 70%;LVEDV = 51 ml.), revealing normal LV function.  ------------------------------------------------------------------------  IMPRESSIONS:Normal Scan, Abnormal ECG Study  * Exercise capacity: 10 METS, Excellent for age and  gender.  * Chest Pain: No chest pain with exercise.  * Symptom: Fatigue.  * HR Response: Appropriate.  * BP Response: Appropriate.  * Heart Rhythm: Sinus Rhythm - Frequent APD's, Occassional  VPD's - 63 BPM.  * Q Waves: III, aVF.  * Baseline ECG: Poor R wave progression.  * ECG Changes: ST Depression: .5 mm upsloping in leads V4,  V5, V6 started at 07:16 min of exercise at HR of 122 and  persisted 01:00 min into recovery.  * Arrhythmia: Multiple PVCs during and stress test with  bigeminy and trigeminy during stress test and recovery. .  * The left ventricle was normal in size. Normal myocardial  perfusion scan, with no evidence of infarction or  inducible ischemia.  * Post-stress gated wall motion analysis was performed  (LVEF > 70%;LVEDV = 51 ml.), revealing normal LV function.  Conclusion:  The left ventricle was normal in size. Normal myocardial  perfusion scan, with no evidenceof infarction or  inducible ischemia.  Arrhythmia: Multiple PVCs during and stress test with  bigeminy and trigeminy during stress test and recovery.  ------------------------------------------------------------------------  Confirmed on  2018 -11:54:23 by Orville Bush D.O.  ------------------------------------------------------------------------

## 2019-04-28 NOTE — ED PROVIDER NOTE - OBJECTIVE STATEMENT
Patient is a  73 year old woman with history of HTN, HLD, symptomatic PVC s/p PVC ablation in 04/17 presented with chest pressure. Patient has cardiac catherization 09/2018 which showed normal coronaries and TTE shows EF 62%. Cardiac MRI in 1/31 showed no signs of ischemic, scar or fibrosis       Cedric Maldonado (EP)  Lexi Winn (PCP) Patient is a  73 year old woman with history of HTN, HLD, symptomatic PVC s/p PVC ablation in 04/17 presented with palpitations that started Day 3 after the procedure. Patient said symptoms feel similar to prior episodes before the ablation. She endorses dyspnea on exertion especially walking up and down the stairs as she lives on second floor. She denies chest pain, no shortness of breath. Each episode usually lasts a few hours. This morning her symptoms started around 7 am and improved at 11 am. She also endorses bilateral tinnitus with this morning's episode. She denies abdominal pain, nausea or vomiting. Patient has cardiac catherization 09/2018 which showed normal coronaries and TTE shows EF 62%. Cardiac MRI in 1/31 showed no signs of ischemic, scar or fibrosis       Cedric Maldonado (EP)  Lexi Winn (PCP)

## 2019-04-28 NOTE — ED CDU PROVIDER INITIAL DAY NOTE - OBJECTIVE STATEMENT
74 y/o female pmh htn, hld c/o chest pain, sob, and palpitations x5 days. Pt admits to having ablation x10 days ago for symptomatic PVCs. Pt states that 5 days after the procedure she started to experience left sided exertional chest pain, GLOVER and palpations. Pt admits to worsening symptoms with walking up stairs. Pt denies diaphoresis, n/v/d, numbness, tingling, weakness ,dizziness, syncope, fever or chills.

## 2019-04-28 NOTE — ED ADULT TRIAGE NOTE - CHIEF COMPLAINT QUOTE
Pt c/o chest pressure since 4/17 after having an ablation  PMH: Pt c/o chest pressure since 4/17 after having an ablation  PMH: PVC's HTN

## 2019-04-28 NOTE — ED PROVIDER NOTE - CLINICAL SUMMARY MEDICAL DECISION MAKING FREE TEXT BOX
73 F with history of HTN, HLD, symptomatic PVC s/p PVC ablation in 04/17 presented with palpitations. Lab including Troponin, EKG wnl. Cards consulted. 73 F with history of HTN, HLD, symptomatic PVC s/p PVC ablation in 04/17 presented with palpitations. Lab including Troponin, EKG wnl. Cards consulted. Seen by cardiology NP, plan to monitor pt in CDU overnight and eval by NP tomorrow AM.

## 2019-04-28 NOTE — CONSULT NOTE ADULT - ASSESSMENT
73F with HTN, HLD, s/p recent PVC ablation presents with recurrence of symptoms prior to ablation of GLOVER and palpitations.    D/W Dr. Maldonado, EP attending: would monitor patient overnight in CDU if possible, check ECHO for any evidence of effusion post ablation and EP will follow in am.

## 2019-04-28 NOTE — ED CDU PROVIDER INITIAL DAY NOTE - ATTENDING CONTRIBUTION TO CARE
I performed a face-to-face evaluation of the patient and performed a history and physical examination. I agree with the history and physical examination.    73 F, recent cardiac ablation for PVCs. 5 days after procedure, developed chest pain/pressure that’s on and off, lasts up to hours, worse w/ exertion, no radiation to back, does radiate to throat. No infectious Sx or VTE risks. Associated w/ palpitations. EKG w/ anterior TWI . PE unremarkable. Seen by EP. Will monitor and echo. Discuss w/ EP about stress test (had negative stress in 9/18).

## 2019-04-28 NOTE — ED CDU PROVIDER INITIAL DAY NOTE - MEDICAL DECISION MAKING DETAILS
74 y/o female w/ exertional chest pain, sob, and palpitaitons x5 days- labs, tele, echo, stress, EP consult

## 2019-04-28 NOTE — ED ADULT NURSE REASSESSMENT NOTE - NS ED NURSE REASSESS COMMENT FT1
Pr remains In NAD NSR on monitor.  Repeat troponin sent as ordered.  Report given to MINO Andersen in CDU.  Pt escorted to CDU bed 7 via wheelchair by ALDAIR Partida.

## 2019-04-28 NOTE — ED PROVIDER NOTE - ATTENDING CONTRIBUTION TO CARE
Patient is a 74 yo F with history of HTN, hyperlipidemia s/p ablation for palpitations on 4/17/19 now here for palpitations. Patient states she feels chest discomfort, GLOVER. Patient states symptoms are not as severe as before but still present. Today, patient had symptoms from 7 AM to 11 AM. Patient also c/o bilateral tinnitus. She is on 81 mg of aspirin daily. Patient had a catheterization done in 09/2018. Patient is a 74 yo F with history of HTN, hyperlipidemia s/p ablation for palpitations on 4/17/19 now here for palpitations. Patient states she feels chest discomfort, GLOVER. Patient states symptoms are not as severe as before but still present. Today, patient had symptoms from 7 AM to 11 AM. Patient also c/o bilateral tinnitus. She is on 81 mg of aspirin daily. Patient had a catheterization done in 09/2018.    VS noted  Gen. no acute distress, Non toxic   HEENT: EOMI, mmm, b/l TM wnl  Lungs: CTAB/L no C/ W /R   CVS: RRR   Abd; Soft non tender, non distended   Ext: no edema  Skin: no rash  Neuro AAOx3 non focal clear speech  a/p: palpitations - s/p recent ablation. Will check labs, ekg, consult EP.   - Adwoa HONEYCUTT

## 2019-04-28 NOTE — ED ADULT NURSE NOTE - OBJECTIVE STATEMENT
Pt w/ hx of irregular heart rate s/p ablation on 4/17 received to rm 28 aaox4 ambulatory c/o chest pressure GLOVER, and palpitations since ablation Pt denies SOB HA dizziness fatigue  numbness tingling.  Pt p/w NAD breaths even unlabored skin warm dry intact NSr on monitor 20 g IV access obtained at l. labs as ordered, cardiology to see will monitor.

## 2019-04-28 NOTE — ED PROVIDER NOTE - PROGRESS NOTE DETAILS
Lavon HONEYCUTT: Patient's palpitations now better. Spoke with EP NP, wants the patient to be observed overnight for EP to see in the morning. Lavon HONEYCUTT: Patient reports that her symptoms are resolving. Spoke with cardiology NP, wants patient to be monitored overnight. EP to see the patient tomorrow.

## 2019-04-29 VITALS
TEMPERATURE: 98 F | SYSTOLIC BLOOD PRESSURE: 113 MMHG | HEART RATE: 82 BPM | DIASTOLIC BLOOD PRESSURE: 62 MMHG | OXYGEN SATURATION: 97 % | RESPIRATION RATE: 16 BRPM

## 2019-04-29 PROCEDURE — 93306 TTE W/DOPPLER COMPLETE: CPT | Mod: 26

## 2019-04-29 PROCEDURE — 78452 HT MUSCLE IMAGE SPECT MULT: CPT | Mod: 26

## 2019-04-29 PROCEDURE — 99217: CPT

## 2019-04-29 PROCEDURE — 93018 CV STRESS TEST I&R ONLY: CPT | Mod: GC

## 2019-04-29 PROCEDURE — 93016 CV STRESS TEST SUPVJ ONLY: CPT | Mod: GC

## 2019-04-29 RX ADMIN — AMLODIPINE BESYLATE 10 MILLIGRAM(S): 2.5 TABLET ORAL at 06:42

## 2019-04-29 RX ADMIN — Medication 81 MILLIGRAM(S): at 12:44

## 2019-04-29 NOTE — ED CDU PROVIDER DISPOSITION NOTE - NSFOLLOWUPINSTRUCTIONS_ED_ALL_ED_FT
Follow up with your primary doctor and Dr. Maldonado, you have an appointment with Dr. Maldonado on 5/31. Dr. Maldonado's phone number is . You are having a monitor mailed to you with instructions, wear the monitor as soon as it comes until you see Dr. Maldonado.  Advance activity as tolerated.  Continue all previously prescribed medications as directed.  Follow up with your primary care physician in 48-72 hours- bring copies of your results.  Return to the ER for worsening or persistent symptoms, and/or ANY NEW OR CONCERNING SYMPTOMS. This includes but is not limited to chest pain, shortness of breath, passing out or feeling like you will pass out or any other symptoms that concern you. If you have issues obtaining follow up, please call: [phone] to obtain a doctor or specialist who takes your insurance in your area.  You may call [phone] to make an appointment with the internal medicine clinic.

## 2019-04-29 NOTE — ED CDU PROVIDER DISPOSITION NOTE - ATTENDING CONTRIBUTION TO CARE
I performed a face-to-face evaluation of the patient and performed a history and physical examination. I agree with the history and physical examination.    Kelsey: Recent ablation for PVCs. P/w palpitations and chest pain. EKG, echo, stress test, labs unremarkable. Seen by Cards (EP). Dc home.

## 2019-04-29 NOTE — PROGRESS NOTE ADULT - SUBJECTIVE AND OBJECTIVE BOX
Patient is a 73y old  Female who presents with a chief complaint of GLOVER (28 Apr 2019 15:20)  Assessment done in [facility].  Patient reports felt palpitations yesterday morning but no palpitations after pm and overnight.  Telemetry w/o arrhythmia seen.        PAST MEDICAL & SURGICAL HISTORY:  Symptomatic PVCs  Hyperlipidemia  Hypertension  No significant past surgical history      MEDICATIONS  (STANDING):  amLODIPine   Tablet 10 milliGRAM(s) Oral daily  aspirin  chewable 81 milliGRAM(s) Oral daily  simvastatin 20 milliGRAM(s) Oral at bedtime    MEDICATIONS  (PRN):            Vital Signs Last 24 Hrs  T(C): 36.6 (29 Apr 2019 04:29), Max: 37.1 (28 Apr 2019 12:13)  T(F): 97.8 (29 Apr 2019 04:29), Max: 98.7 (28 Apr 2019 12:13)  HR: 64 (29 Apr 2019 04:29) (58 - 89)  BP: 127/63 (29 Apr 2019 04:29) (126/65 - 157/65)  BP(mean): --  RR: 18 (29 Apr 2019 04:29) (16 - 18)  SpO2: 98% (29 Apr 2019 04:29) (97% - 100%)            INTERPRETATION OF TELEMETRY: NSR 60 's bpm    ECG:NSR; TW abnormality        LABS:                        14.5   5.82  )-----------( 331      ( 28 Apr 2019 14:29 )             46.6     04-28    142  |  101  |  9   ----------------------------<  86  3.9   |  26  |  0.66    Ca    10.0      28 Apr 2019 14:29  Phos  3.7     04-28  Mg     2.3     04-28    TPro  8.4<H>  /  Alb  4.6  /  TBili  0.4  /  DBili  x   /  AST  33<H>  /  ALT  41<H>  /  AlkPhos  75  04-28              BNP  RADIOLOGY & ADDITIONAL STUDIES:  CORONARY VESSELS: The coronary circulation is right dominant.  LM:   --  LM: Normal.  LAD:   --  LAD: Normal.  CX:   --  Circumflex: Normal.  RCA:   --  RCA: Normal.  COMPLICATIONS: There were no complications.  DIAGNOSTIC RECOMMENDATIONS: The patient should continue with the present  medications.  Prepared and signed by  Linden Viramontes M.D.  Signed 09/19/2018 10:34:28      PHYSICAL EXAM:    GENERAL: In no apparent distress, well nourished, and hydrated.  NECK: Supple and normal thyroid.  No JVD or carotid bruit.  Carotid pulse is 2+ bilaterally.  HEART: Regular rate and rhythm; No murmurs, rubs, or gallops.  PULMONARY: Clear to auscultation and perfusion.  No rales, wheezing, or rhonchi bilaterally.  ABDOMEN: Soft, Nontender, Nondistended; Bowel sounds present  EXTREMITIES:  2+ Peripheral Pulses, No clubbing, cyanosis, or edema  NEUROLOGICAL: Grossly nonfocal

## 2019-04-29 NOTE — PROGRESS NOTE ADULT - ASSESSMENT
73F with HTN, HLD, s/p recent PVC ablation on 4/17/2019 presents with recurrence of symptoms prior to ablation of GLOVER and palpitations.  Her beta-blocker therapy was discontinued post ablation.  No PVC's or tachycardia seen on telemetry.  It's unclear if patient has recurrent symptomatic PVC's (from different site).    -Echocardiogram today  -CardioNet monitor for 3 weeks and follow up with Dr. Maldonado on 5/31/2019 at 11am  -Avoid beta-blocker until after CardioNet for PVC detection  -May discharge home if negative findings on echocardiogram, discussed with Dr. Maldonado

## 2019-04-29 NOTE — ED CDU PROVIDER SUBSEQUENT DAY NOTE - PROGRESS NOTE DETAILS
Pt cleared by electrophysiology, pt to F/U outpatient. Spoke to patient with Mandarin , pt is aware of plan and states she feels much better. Monitor from EP is being mailed to patient's home.

## 2019-04-29 NOTE — ED CDU PROVIDER DISPOSITION NOTE - CLINICAL COURSE
Kelsey: Recent ablation for PVCs. P/w palpitations and chest pain. EKG, echo, stress test, labs unremarkable. Seen by Cards (EP). Dc home.

## 2019-04-29 NOTE — ED CDU PROVIDER SUBSEQUENT DAY NOTE - ATTENDING CONTRIBUTION TO CARE
I performed a face-to-face evaluation of the patient and performed a history and physical examination. I agree with the history and physical examination.    No adverse events overnight. Vital signs unremarkable. Seen by Cards this AM. Awaiting echo and stress test for chest pain and palpitations.

## 2019-04-29 NOTE — ED CDU PROVIDER SUBSEQUENT DAY NOTE - HISTORY
73F with HTN, HLD, s/p recent PVC ablation (4/17/2019) presents with GLOVER and palpitations. Patient states through Mandarin  that 3 days after ablation she started to experience dyspnea with exertion, mostly going up stairs and palpitations. She states that these symptoms are the same as before her ablation but not as intense. These symptoms have occurred daily and can last up to 4 hours. She also reports associated tinnitus. At discharge metoprolol was discontinued. In ER EKG with NSR, unchanged from previous, no VPCs with stable VS.   CDU Subsequent Day Note: SERGIO Charles, agree w/ above, pt w/ no observed ectopy on tele, pending stress/TTE in AM, will continue to obs on tele and document any acute interval changes.

## 2019-05-01 PROCEDURE — G9005: CPT

## 2019-05-03 ENCOUNTER — APPOINTMENT (OUTPATIENT)
Dept: ELECTROPHYSIOLOGY | Facility: CLINIC | Age: 74
End: 2019-05-03

## 2019-05-31 ENCOUNTER — NON-APPOINTMENT (OUTPATIENT)
Age: 74
End: 2019-05-31

## 2019-05-31 ENCOUNTER — APPOINTMENT (OUTPATIENT)
Dept: ELECTROPHYSIOLOGY | Facility: CLINIC | Age: 74
End: 2019-05-31
Payer: MEDICARE

## 2019-05-31 VITALS
WEIGHT: 149 LBS | DIASTOLIC BLOOD PRESSURE: 76 MMHG | BODY MASS INDEX: 25.44 KG/M2 | HEIGHT: 64 IN | OXYGEN SATURATION: 97 % | HEART RATE: 90 BPM | SYSTOLIC BLOOD PRESSURE: 121 MMHG

## 2019-05-31 PROCEDURE — 93000 ELECTROCARDIOGRAM COMPLETE: CPT

## 2019-05-31 PROCEDURE — 99214 OFFICE O/P EST MOD 30 MIN: CPT

## 2019-06-01 NOTE — DISCUSSION/SUMMARY
[FreeTextEntry1] : In summary, Diana Cisneros is a 72 y/o woman, Mandarin speaking, with Hx of HTN, HLD and symptomatic PVCs s/p [facility] PVC ablation on 4/17/2019 who presents today for routine f/u. Post ablation, had an episode of palpitations and dyspnea which felt similar to PVCs. Was seen in ED where no evidence of arrhythmias were noted. Discharged home with CardioNet. CardioNet revealed no evidence of PVCs. Symptoms of dizziness during activity correlated with NSR. No evidence of tachy or bradyarrhythmias. Denies chest pain, palpitations, SOB, syncope or near syncope. Right groin without pain, swelling, or bruising. EKG today NSR without evidence of PVCs. Reassurance provided. Will resume metoprolol as prescribed and regular f/u with Cardiologist. \par \par Sincerely,\par \par Cedric Maldonado MD

## 2019-06-01 NOTE — HISTORY OF PRESENT ILLNESS
[FreeTextEntry1] : Tucker Tolentino MD\par \par Diana Cisneros is a 72 y/o woman, Mandarin speaking, with Hx of HTN, HLD and symptomatic PVCs s/p AMC PVC ablation on 4/17/2019 who presents today for routine f/u. Post ablation, had an episode of palpitations and dyspnea which felt similar to PVCs. Was seen in ED where no evidence of arrhythmias were noted. Discharged home with CardioNet. CardioNet revealed no evidence of PVCs. Symptoms of dizziness during activity correlated with NSR. No evidence of tachy or bradyarrhythmias. Denies chest pain, palpitations, SOB, syncope or near syncope. Right groin without pain, swelling, or bruising.

## 2019-07-10 DIAGNOSIS — Z71.89 OTHER SPECIFIED COUNSELING: ICD-10-CM

## 2020-02-04 NOTE — PATIENT PROFILE ADULT - NSPROPTRIGHTSUPPORTPERSON_GEN_A_NUR
BNP and echo ordered  
CBC ordered  
COPD is unchanged.      
Hypertension is improving with treatment.  Continue current treatment regimen.  Dietary sodium restriction.  Blood pressure will be reassessed in 4 weeks.  
Labs ordered; echo ordered  
yes

## 2020-09-14 NOTE — ED CDU PROVIDER DISPOSITION NOTE - NS ED MD TWO NIGHTS YN
lungs every 6 hours as needed for Wheezing Yes Hailee Dunwaay MD   losartan-hydrochlorothiazide (HYZAAR) 50-12.5 MG per tablet  Yes Historical Provider, MD   acetaminophen (TYLENOL) 500 MG tablet Take 500 mg by mouth every 6 hours as needed for Pain Yes Historical Provider, MD   metoprolol succinate (TOPROL XL) 25 MG extended release tablet TK 1 T PO QD Yes Historical Provider, MD   Nasal Wash SOLN by Nasal route as needed. Yes Historical Provider, MD       Social History     Tobacco Use    Smoking status: Never Smoker    Smokeless tobacco: Never Used   Substance Use Topics    Alcohol use: Yes     Alcohol/week: 0.0 standard drinks     Comment: Occassional    Drug use: No        Allergies   Allergen Reactions    Latex Rash    Norvasc [Amlodipine Besylate]     Ace Inhibitors      Cough    Soybean-Containing Drug Products    ,   Past Medical History:   Diagnosis Date    Arthritis     Right Hip    Asthma     Cardiomyopathy, nonischemic ([facility] [address]     hypertensive type    COPD (chronic obstructive pulmonary disease) ([facility] [address] 2014    per PFT 2014    Edema     Essential hypertension     History of blood transfusion     Hyperlipidemia     Impaired fasting glucose     Major depressive disorder, single episode, unspecified     Mitral valve regurgitation     Obesity     Obstructive sleep apnea on CPAP     Osteopenia     Reflux esophagitis    ,   Past Surgical History:   Procedure Laterality Date    CARDIAC CATHETERIZATION      X 2 - Dr. Tomi Valderrama; No interventions    CATARACT REMOVAL      COLONOSCOPY      COLONOSCOPY  10/20/2014    colon polyp, internal hemorrhoids    DILATION AND CURETTAGE OF UTERUS      X 2    EYE SURGERY  11years old   [facility] WISDOM TOOTH EXTRACTION  as a child   ,   Social History     Tobacco Use    Smoking status: Never Smoker    Smokeless tobacco: Never Used   Substance Use Topics    Alcohol use: Yes     Alcohol/week: 0.0 standard drinks     Comment: Occassional    Drug use:  No
Yes

## 2020-12-25 ENCOUNTER — EMERGENCY (EMERGENCY)
Facility: HOSPITAL | Age: 75
LOS: 1 days | End: 2020-12-25
Attending: STUDENT IN AN ORGANIZED HEALTH CARE EDUCATION/TRAINING PROGRAM
Payer: MEDICARE

## 2020-12-25 VITALS
TEMPERATURE: 98 F | WEIGHT: 149.91 LBS | HEART RATE: 105 BPM | DIASTOLIC BLOOD PRESSURE: 95 MMHG | SYSTOLIC BLOOD PRESSURE: 178 MMHG | HEIGHT: 64 IN | OXYGEN SATURATION: 99 % | RESPIRATION RATE: 18 BRPM

## 2020-12-25 LAB
ALBUMIN SERPL ELPH-MCNC: 4.3 G/DL — SIGNIFICANT CHANGE UP (ref 3.3–5)
ALP SERPL-CCNC: 76 U/L — SIGNIFICANT CHANGE UP (ref 40–120)
ALT FLD-CCNC: 22 U/L — SIGNIFICANT CHANGE UP (ref 10–45)
ANION GAP SERPL CALC-SCNC: 12 MMOL/L — SIGNIFICANT CHANGE UP (ref 5–17)
ANION GAP SERPL CALC-SCNC: 13 MMOL/L — SIGNIFICANT CHANGE UP (ref 5–17)
APPEARANCE UR: CLEAR — SIGNIFICANT CHANGE UP
AST SERPL-CCNC: 20 U/L — SIGNIFICANT CHANGE UP (ref 10–40)
BACTERIA # UR AUTO: NEGATIVE — SIGNIFICANT CHANGE UP
BASE EXCESS BLDV CALC-SCNC: 2.5 MMOL/L — HIGH (ref -2–2)
BASOPHILS # BLD AUTO: 0.04 K/UL — SIGNIFICANT CHANGE UP (ref 0–0.2)
BASOPHILS NFR BLD AUTO: 0.6 % — SIGNIFICANT CHANGE UP (ref 0–2)
BILIRUB SERPL-MCNC: 0.3 MG/DL — SIGNIFICANT CHANGE UP (ref 0.2–1.2)
BILIRUB UR-MCNC: NEGATIVE — SIGNIFICANT CHANGE UP
BUN SERPL-MCNC: 14 MG/DL — SIGNIFICANT CHANGE UP (ref 7–23)
BUN SERPL-MCNC: 18 MG/DL — SIGNIFICANT CHANGE UP (ref 7–23)
CA-I SERPL-SCNC: 1.17 MMOL/L — SIGNIFICANT CHANGE UP (ref 1.12–1.3)
CALCIUM SERPL-MCNC: 10.1 MG/DL — SIGNIFICANT CHANGE UP (ref 8.4–10.5)
CALCIUM SERPL-MCNC: 9.4 MG/DL — SIGNIFICANT CHANGE UP (ref 8.4–10.5)
CHLORIDE BLDV-SCNC: 106 MMOL/L — SIGNIFICANT CHANGE UP (ref 96–108)
CHLORIDE SERPL-SCNC: 101 MMOL/L — SIGNIFICANT CHANGE UP (ref 96–108)
CHLORIDE SERPL-SCNC: 102 MMOL/L — SIGNIFICANT CHANGE UP (ref 96–108)
CO2 BLDV-SCNC: 29 MMOL/L — SIGNIFICANT CHANGE UP (ref 22–30)
CO2 SERPL-SCNC: 22 MMOL/L — SIGNIFICANT CHANGE UP (ref 22–31)
CO2 SERPL-SCNC: 26 MMOL/L — SIGNIFICANT CHANGE UP (ref 22–31)
COLOR SPEC: COLORLESS — SIGNIFICANT CHANGE UP
CREAT SERPL-MCNC: 0.71 MG/DL — SIGNIFICANT CHANGE UP (ref 0.5–1.3)
CREAT SERPL-MCNC: 0.82 MG/DL — SIGNIFICANT CHANGE UP (ref 0.5–1.3)
DIFF PNL FLD: ABNORMAL
EOSINOPHIL # BLD AUTO: 0.28 K/UL — SIGNIFICANT CHANGE UP (ref 0–0.5)
EOSINOPHIL NFR BLD AUTO: 4.3 % — SIGNIFICANT CHANGE UP (ref 0–6)
EPI CELLS # UR: 1 /HPF — SIGNIFICANT CHANGE UP
GAS PNL BLDV: 136 MMOL/L — SIGNIFICANT CHANGE UP (ref 135–145)
GAS PNL BLDV: SIGNIFICANT CHANGE UP
GAS PNL BLDV: SIGNIFICANT CHANGE UP
GLUCOSE BLDV-MCNC: 123 MG/DL — HIGH (ref 70–99)
GLUCOSE SERPL-MCNC: 105 MG/DL — HIGH (ref 70–99)
GLUCOSE SERPL-MCNC: 118 MG/DL — HIGH (ref 70–99)
GLUCOSE UR QL: NEGATIVE — SIGNIFICANT CHANGE UP
HCO3 BLDV-SCNC: 27 MMOL/L — SIGNIFICANT CHANGE UP (ref 21–29)
HCT VFR BLD CALC: 42.1 % — SIGNIFICANT CHANGE UP (ref 34.5–45)
HCT VFR BLDA CALC: 43 % — SIGNIFICANT CHANGE UP (ref 39–50)
HGB BLD CALC-MCNC: 13.9 G/DL — SIGNIFICANT CHANGE UP (ref 11.5–15.5)
HGB BLD-MCNC: 13.5 G/DL — SIGNIFICANT CHANGE UP (ref 11.5–15.5)
HYALINE CASTS # UR AUTO: 0 /LPF — SIGNIFICANT CHANGE UP (ref 0–2)
IMM GRANULOCYTES NFR BLD AUTO: 0.3 % — SIGNIFICANT CHANGE UP (ref 0–1.5)
KETONES UR-MCNC: NEGATIVE — SIGNIFICANT CHANGE UP
LACTATE BLDV-MCNC: 1.3 MMOL/L — SIGNIFICANT CHANGE UP (ref 0.7–2)
LEUKOCYTE ESTERASE UR-ACNC: ABNORMAL
LYMPHOCYTES # BLD AUTO: 1.99 K/UL — SIGNIFICANT CHANGE UP (ref 1–3.3)
LYMPHOCYTES # BLD AUTO: 30.3 % — SIGNIFICANT CHANGE UP (ref 13–44)
MAGNESIUM SERPL-MCNC: 2.1 MG/DL — SIGNIFICANT CHANGE UP (ref 1.6–2.6)
MAGNESIUM SERPL-MCNC: 2.3 MG/DL — SIGNIFICANT CHANGE UP (ref 1.6–2.6)
MCHC RBC-ENTMCNC: 28.6 PG — SIGNIFICANT CHANGE UP (ref 27–34)
MCHC RBC-ENTMCNC: 32.1 GM/DL — SIGNIFICANT CHANGE UP (ref 32–36)
MCV RBC AUTO: 89.2 FL — SIGNIFICANT CHANGE UP (ref 80–100)
MONOCYTES # BLD AUTO: 0.79 K/UL — SIGNIFICANT CHANGE UP (ref 0–0.9)
MONOCYTES NFR BLD AUTO: 12 % — SIGNIFICANT CHANGE UP (ref 2–14)
NEUTROPHILS # BLD AUTO: 3.44 K/UL — SIGNIFICANT CHANGE UP (ref 1.8–7.4)
NEUTROPHILS NFR BLD AUTO: 52.5 % — SIGNIFICANT CHANGE UP (ref 43–77)
NITRITE UR-MCNC: NEGATIVE — SIGNIFICANT CHANGE UP
NRBC # BLD: 0 /100 WBCS — SIGNIFICANT CHANGE UP (ref 0–0)
NT-PROBNP SERPL-SCNC: 26 PG/ML — SIGNIFICANT CHANGE UP (ref 0–300)
PCO2 BLDV: 44 MMHG — SIGNIFICANT CHANGE UP (ref 35–50)
PH BLDV: 7.41 — SIGNIFICANT CHANGE UP (ref 7.35–7.45)
PH UR: 6 — SIGNIFICANT CHANGE UP (ref 5–8)
PLATELET # BLD AUTO: 261 K/UL — SIGNIFICANT CHANGE UP (ref 150–400)
PO2 BLDV: 50 MMHG — HIGH (ref 25–45)
POTASSIUM BLDV-SCNC: 3.2 MMOL/L — LOW (ref 3.5–5.3)
POTASSIUM SERPL-MCNC: 3.3 MMOL/L — LOW (ref 3.5–5.3)
POTASSIUM SERPL-MCNC: 4 MMOL/L — SIGNIFICANT CHANGE UP (ref 3.5–5.3)
POTASSIUM SERPL-SCNC: 3.3 MMOL/L — LOW (ref 3.5–5.3)
POTASSIUM SERPL-SCNC: 4 MMOL/L — SIGNIFICANT CHANGE UP (ref 3.5–5.3)
PROT SERPL-MCNC: 7.1 G/DL — SIGNIFICANT CHANGE UP (ref 6–8.3)
PROT UR-MCNC: NEGATIVE — SIGNIFICANT CHANGE UP
RBC # BLD: 4.72 M/UL — SIGNIFICANT CHANGE UP (ref 3.8–5.2)
RBC # FLD: 11.9 % — SIGNIFICANT CHANGE UP (ref 10.3–14.5)
RBC CASTS # UR COMP ASSIST: 2 /HPF — SIGNIFICANT CHANGE UP (ref 0–4)
SAO2 % BLDV: 85 % — SIGNIFICANT CHANGE UP (ref 67–88)
SARS-COV-2 RNA SPEC QL NAA+PROBE: SIGNIFICANT CHANGE UP
SODIUM SERPL-SCNC: 137 MMOL/L — SIGNIFICANT CHANGE UP (ref 135–145)
SODIUM SERPL-SCNC: 139 MMOL/L — SIGNIFICANT CHANGE UP (ref 135–145)
SP GR SPEC: 1.01 — SIGNIFICANT CHANGE UP (ref 1.01–1.02)
TROPONIN T, HIGH SENSITIVITY RESULT: <6 NG/L — SIGNIFICANT CHANGE UP (ref 0–51)
TROPONIN T, HIGH SENSITIVITY RESULT: <6 NG/L — SIGNIFICANT CHANGE UP (ref 0–51)
TSH SERPL-MCNC: 3.31 UIU/ML — SIGNIFICANT CHANGE UP (ref 0.27–4.2)
UROBILINOGEN FLD QL: NEGATIVE — SIGNIFICANT CHANGE UP
WBC # BLD: 6.56 K/UL — SIGNIFICANT CHANGE UP (ref 3.8–10.5)
WBC # FLD AUTO: 6.56 K/UL — SIGNIFICANT CHANGE UP (ref 3.8–10.5)
WBC UR QL: 4 /HPF — SIGNIFICANT CHANGE UP (ref 0–5)

## 2020-12-25 PROCEDURE — 93010 ELECTROCARDIOGRAM REPORT: CPT

## 2020-12-25 PROCEDURE — 99220: CPT | Mod: CS

## 2020-12-25 PROCEDURE — 71045 X-RAY EXAM CHEST 1 VIEW: CPT | Mod: 26

## 2020-12-25 PROCEDURE — 93306 TTE W/DOPPLER COMPLETE: CPT | Mod: 26

## 2020-12-25 PROCEDURE — 99235 HOSP IP/OBS SAME DATE MOD 70: CPT | Mod: GC

## 2020-12-25 RX ORDER — ASPIRIN/CALCIUM CARB/MAGNESIUM 324 MG
81 TABLET ORAL DAILY
Refills: 0 | Status: DISCONTINUED | OUTPATIENT
Start: 2020-12-25 | End: 2020-12-28

## 2020-12-25 RX ORDER — SIMVASTATIN 20 MG/1
20 TABLET, FILM COATED ORAL AT BEDTIME
Refills: 0 | Status: DISCONTINUED | OUTPATIENT
Start: 2020-12-25 | End: 2020-12-28

## 2020-12-25 RX ORDER — NITROFURANTOIN MACROCRYSTAL 50 MG
100 CAPSULE ORAL ONCE
Refills: 0 | Status: COMPLETED | OUTPATIENT
Start: 2020-12-25 | End: 2020-12-25

## 2020-12-25 RX ORDER — METOPROLOL TARTRATE 50 MG
12.5 TABLET ORAL EVERY 12 HOURS
Refills: 0 | Status: DISCONTINUED | OUTPATIENT
Start: 2020-12-25 | End: 2020-12-25

## 2020-12-25 RX ORDER — METOPROLOL TARTRATE 50 MG
25 TABLET ORAL
Refills: 0 | Status: DISCONTINUED | OUTPATIENT
Start: 2020-12-25 | End: 2020-12-28

## 2020-12-25 RX ORDER — NITROFURANTOIN MACROCRYSTAL 50 MG
100 CAPSULE ORAL
Refills: 0 | Status: DISCONTINUED | OUTPATIENT
Start: 2020-12-25 | End: 2020-12-28

## 2020-12-25 RX ORDER — POTASSIUM CHLORIDE 20 MEQ
40 PACKET (EA) ORAL ONCE
Refills: 0 | Status: COMPLETED | OUTPATIENT
Start: 2020-12-25 | End: 2020-12-25

## 2020-12-25 RX ADMIN — Medication 100 MILLIGRAM(S): at 05:40

## 2020-12-25 RX ADMIN — Medication 25 MILLIGRAM(S): at 11:23

## 2020-12-25 RX ADMIN — Medication 81 MILLIGRAM(S): at 18:19

## 2020-12-25 RX ADMIN — Medication 100 MILLIGRAM(S): at 18:19

## 2020-12-25 RX ADMIN — Medication 40 MILLIEQUIVALENT(S): at 05:40

## 2020-12-25 NOTE — CONSULT NOTE ADULT - ATTENDING COMMENTS
I interviewed (translation provided by Video  [id number]) and examined Ms. Cisneros.  Her case was discussed with Dr. Stark and the patient's daughter.  The patient reports an increasing frequency of palpitations as well as lightheadedness immediately upon standing from a seated position.  She denies chest pain, syncope and lower extremity edema.  Labs remarkable for hypokalemia.  Recommend K > 4, Mg > 2 and starting metoprolol tartrate 25 mg bid (outpatient dose).  Please obtain TTE and repeat ECG.

## 2020-12-25 NOTE — CONSULT NOTE ADULT - ASSESSMENT
73F Mandarin speaking w/ HTN, HLD and symptomatic PVCs s/p PVC ablation on 4/17/2019 (known to our general and EP cardiology teams) who presented today with 2 days of palpitations. Patient has been followed by Lobo Maldonado and Rajan. Since May 2019, at which time she last reported palpitations, her course has been unremarkable. At that time monitoring did not reveal arrhythmia. However, she started having palpitations again for the last 2 days, progressively longer in duration and associated with mild SOB. Currently, HDS and asymptomatic w/ tele showing NSR w/ intermittent PVCs. Unlikely to be from ischemia as her last cath over the last 2 years was normal, does not have chest pain/troponin elevation/ECG changes consistent w/ ischemia. She was found to be hypokalemic (3.3), which is likely contributing to her PVCs.     Recs:  - CTM on tele; to be admitted to CDU per primary team  - echo  - TSH  - replete lytes, K > 4, Mg > 2 and reassess PVC burden  - can consider outpatient cardiac monitoring to assess for PVC burden when ready for discharge    Poncho Stark MD  Cardiology Fellow PGY-4  [facility]    Notes are not final until signed by attending  For all consults and questions:  [email or web address]   Login: [email or web address] Mandarin speaking w/ HTN, HLD and symptomatic PVCs s/p PVC ablation on 4/17/2019 (known to our general and EP cardiology teams) who presented today with 2 days of palpitations. Patient has been followed by Lobo Maldonado and Rajan. Since May 2019, at which time she last reported palpitations, her course has been unremarkable. At that time monitoring did not reveal arrhythmia. However, she started having palpitations again for the last 2 days, progressively longer in duration and associated with mild SOB. Patient reports these are similar symptoms to the ones back in May 2019. Currently, HDS and asymptomatic w/ tele showing NSR w/ intermittent PVCs. Unlikely to be from ischemia as her last cath over the last 2 years was normal, does not have chest pain/troponin elevation/ECG changes consistent w/ ischemia. She was found to be hypokalemic (3.3), which is likely contributing to her PVCs.     Recs:  - CTM on tele; to be admitted to CDU per primary team  - echo  - TSH  - replete lytes, K > 4, Mg > 2 and reassess PVC burden  - can consider outpatient cardiac monitoring to assess for PVC burden when ready for discharge    Poncho Stark MD  Cardiology Fellow PGY-4  [facility]    Notes are not final until signed by attending  For all consults and questions:  [email or web address]   Login: [email or web address] Mandarin speaking w/ HTN, HLD and symptomatic PVCs s/p PVC ablation on 4/17/2019 (known to our general and EP cardiology teams) who presented today with 2 days of palpitations. Patient has been followed by Lobo Maldonado and Rajan. Since May 2019, at which time she last reported palpitations, her course has been unremarkable. At that time monitoring did not reveal arrhythmia. However, she started having palpitations again for the last 2 days, progressively longer in duration and associated with mild SOB. Patient reports these are similar symptoms to the ones back in May 2019. Currently, HDS and asymptomatic w/ tele showing NSR w/ intermittent PVCs. Unlikely to be from ischemia as her last cath over the last 2 years was normal, does not have chest pain/troponin elevation/ECG changes consistent w/ ischemia. She was found to be hypokalemic (3.3), which is likely contributing to her PVCs.     Recs:  - CTM on tele; to be admitted to CDU per primary team  - echo  - TSH  - replete lytes, K > 4, Mg > 2 and reassess PVC burden  - start lopressor 12.5mg BID  - can consider outpatient cardiac monitoring to assess for PVC burden when ready for discharge    Poncho Stark MD  Cardiology Fellow PGY-4  [facility]    Notes are not final until signed by attending  For all consults and questions:  [email or web address]   Login: [email or web address]

## 2020-12-25 NOTE — ED ADULT NURSE NOTE - OBJECTIVE STATEMENT
Pt is a 75y Female c/o irregular heart beat, palpitations at home for the past 3-4 days with associated dizziness. Pt states the palpitations have been worsening over the past day. Pt states she has had episodes like this in the past the last one was 5 days ago. Pt last saw a PMD over 1 year ago. Pt denies SOB, pain, fevers, NVD.

## 2020-12-25 NOTE — ED CDU PROVIDER DISPOSITION NOTE - CLINICAL COURSE
Pt is a 74 yo F with HTN, HLD, s/p PVC ablation (4/17/2019) who presents with palpitations. She says that for the past 4 days she has been having palpitations that come and go. Over the past day her palpitations have been getting worse. She has palpitations both at rest and exertion. Patient reports an increasing frequency of palpitations as well as lightheadedness immediately upon standing from a seated position.  She denies chest pain, syncope and lower extremity edema.  Denies fevers, chills, N/V, abd pain.   In ED, patient had laboratory significant for hypokalemia and Urine concerning for UTI. Pt was given Potassium 40mEq po and started on Macrobid 100mg. Pt was evaluated by Cardiology and sent to CDU for frequent reeval, vitals q 4hrs, tele monitoring, TTE, lytes correction and observation. Pt is a 74 yo F with HTN, HLD, s/p PVC ablation (4/17/2019) who presents with palpitations. She says that for the past 4 days she has been having palpitations that come and go. Over the past day her palpitations have been getting worse. She has palpitations both at rest and exertion. Patient reports an increasing frequency of palpitations as well as lightheadedness immediately upon standing from a seated position.  She denies chest pain, syncope and lower extremity edema.  Denies fevers, chills, N/V, abd pain.   In ED, patient had laboratory significant for hypokalemia and Urine concerning for UTI. Pt was given Potassium 40mEq po and started on Macrobid 100mg. Pt was evaluated by Cardiology and sent to CDU for frequent reeval, vitals q 4hrs, tele monitoring, TTE, lytes correction and observation. Electrolytes remained wnl on subsequent labs. TTE showed increased LV function. Pt was monitored on tele overnight started on metoprolol 25mg BID. Tele showed occasional PVCs, 2 episodes of bigeminy. Pt was evaluated by cardiology who cleared pt for DC on metoprolol 50mg QD. Pt ready and stable at time of DC.

## 2020-12-25 NOTE — ED CDU PROVIDER DISPOSITION NOTE - NSFOLLOWUPINSTRUCTIONS_ED_ALL_ED_FT
1. Follow up with your PMD within 48-72 hours.   You may schedule appointment with Cardiology clinic this week by calling [phone]  2. Show copies of your reports given to you. Recommend start Metoprolol 25mg, take twice daily. Take all of your other medications as previously prescribed. Start Macrobid 100mg twice daily as prescribed for urinary tract infection.  3. Worsening or continued chest pain, shortness of breath, weakness, return to ED. 1. Follow up with your primary care doctor within 48-72 hours.     You may schedule appointment with Cardiology clinic this week by calling [phone]    2. Show copies of your reports given to you. Recommend start Metoprolol 50mg, take once daily. Take all of your other medications as previously prescribed. Start Macrobid 100mg twice daily as prescribed for urinary tract infection.    See the attached information on food high in potassium. Be sure to eat a diet rich in potassium.    3. Return to the ER for shortness of breath, chest pain, worsening of palpitations, lightheadedness, dizziness, fainting, or any other concerns.      ?48-72?????????????    ????? [phone] ????????????    ??????????????????50??,????????????????????????????????,????100mg???    ???????????????????????????    ?????????????????????????????????????

## 2020-12-25 NOTE — ED PROVIDER NOTE - CLINICAL SUMMARY MEDICAL DECISION MAKING FREE TEXT BOX
Samira: pt is a 74 yo F w HTN, HLD who presents with palpitations. Ddx: ACS vs benign PVCs vs infection from UTI. Will check labs, cbc, cmp, urine, ekg, trop, cxr Samira: pt is a 76 yo F w HTN, HLD, s/p PVC ablation (4/17/2019) who presents with palpitations. Ddx: ACS vs benign PVCs vs infection from UTI. Will check labs, cbc, cmp, urine, ekg, trop, cxr

## 2020-12-25 NOTE — ED ADULT NURSE NOTE - NSIMPLEMENTINTERV_GEN_ALL_ED
Implemented All Universal Safety Interventions:  [place] to call system. Call bell, personal items and telephone within reach. Instruct patient to call for assistance. Room bathroom lighting operational. Non-slip footwear when patient is off stretcher. Physically safe environment: no spills, clutter or unnecessary equipment. Stretcher in lowest position, wheels locked, appropriate side rails in place.

## 2020-12-25 NOTE — ED CDU PROVIDER DISPOSITION NOTE - NSFOLLOWUPCLINICS_GEN_ALL_ED_FT
[facility]  Cardiology  [address] [place] [address]  Phone: [phone]  Fax:   Follow Up Time: 4-6 Days

## 2020-12-25 NOTE — ED CDU PROVIDER INITIAL DAY NOTE - OBJECTIVE STATEMENT
Pt is a 74 yo F with HTN, HLD, s/p PVC ablation (4/17/2019) who presents with palpitations. She says that for the past 4 days she has been having palpitations that come and go. Over the past day her palpitations have been getting worse. She reports that sometimes she has dizziness and lightheadedness with her palpitations but not every time. She has palpitations both at rest and exertion. She does not note SOB or chest pain. Denies fevers, chills, N/V, abd pain. Thinks she might have seen blood in her urine but is unable to articulate further. Pt is a 74 yo F with HTN, HLD, s/p PVC ablation (4/17/2019) who presents with palpitations. She says that for the past 4 days she has been having palpitations that come and go. Over the past day her palpitations have been getting worse. She has palpitations both at rest and exertion. Patient reports an increasing frequency of palpitations as well as lightheadedness immediately upon standing from a seated position.  She denies chest pain, syncope and lower extremity edema.  Denies fevers, chills, N/V, abd pain.   In ED, patient had laboratory significant for hypokalemia and Urine concerning for UTI. Pt was given Potassium 40mEq po and started on Macrobid 100mg. Pt was evaluated by Cardiology and sent to CDU for frequent reeval, vitals q 4hrs, tele monitoring, TTE, lytes correction and observation.

## 2020-12-25 NOTE — ED PROVIDER NOTE - OBJECTIVE STATEMENT
Pt is a 76 yo F with HTN, HLD who presents with palpitations. She says that for the past 4 days she has been having palpitations that come and go. Over the past day her palpitations have been getting worse. She reports that sometimes she has dizziness and lightheadedness with her palpitations but not every time. She has palpitations both at rest and exertion. She does not note SOB or chest pain. Denies fevers, chills, N/V, abd pain. Thinks she might have seen blood in her urine but is unable to articulate further. Pt is a 74 yo F with HTN, HLD, s/p PVC ablation (4/17/2019) who presents with palpitations. She says that for the past 4 days she has been having palpitations that come and go. Over the past day her palpitations have been getting worse. She reports that sometimes she has dizziness and lightheadedness with her palpitations but not every time. She has palpitations both at rest and exertion. She does not note SOB or chest pain. Denies fevers, chills, N/V, abd pain. Thinks she might have seen blood in her urine but is unable to articulate further.

## 2020-12-25 NOTE — ED CDU PROVIDER INITIAL DAY NOTE - PHYSICAL EXAMINATION
Mira Hogan MD  GENERAL: Patient awake alert NAD.  HEENT: NC/AT, Moist mucous membranes, PERRL, EOMI.  LUNGS: CTAB, no wheezes or crackles.   CARDIAC: RRR, no m/r/g.    ABDOMEN: Soft, NT, ND, No rebound, guarding. No CVA tenderness.   EXT: No edema. No calf tenderness.   MSK: no pain with movement, no deformities.  NEURO: A&Ox3. Moving all extremities.  SKIN: Warm and dry. No rash.  PSYCH: Normal affect. GENERAL: Patient awake alert NAD.  HEENT: NC/AT, Moist mucous membranes, PERRL, EOMI.  LUNGS: CTAB, no wheezes or crackles.   CARDIAC: RRR, no m/r/g.    ABDOMEN: Soft, NT, ND, No rebound, guarding. No CVA tenderness.   EXT: No edema. No calf tenderness.   MSK: no pain with movement, no deformities.  NEURO: A&Ox3. Moving all extremities.  SKIN: Warm and dry. No rash.  PSYCH: Normal affect.

## 2020-12-25 NOTE — ED PROVIDER NOTE - NS ED ROS FT
GENERAL: No fever, chills  EYES: no vision changes, no discharge.   ENT: no difficulty swallowing or speaking   CARDIAC: +palpitations, no chest pain/pressure, SOB, lower extremity swelling  PULMONARY: no cough, SOB  GI: no abdominal pain, n/v/d  : no dysuria  SKIN: no rashes  NEURO: no headache, +lightheadedness, no paresthesia  MSK: No joint pain, myalgia, weakness.

## 2020-12-25 NOTE — ED PROVIDER NOTE - PHYSICAL EXAMINATION
Mira Hogan MD  GENERAL: Patient awake alert NAD.  HEENT: NC/AT, Moist mucous membranes, PERRL, EOMI.  LUNGS: CTAB, no wheezes or crackles.   CARDIAC: RRR, no m/r/g.    ABDOMEN: Soft, NT, ND, No rebound, guarding. No CVA tenderness.   EXT: No edema. No calf tenderness.   MSK: no pain with movement, no deformities.  NEURO: A&Ox3. Moving all extremities.  SKIN: Warm and dry. No rash.  PSYCH: Normal affect.

## 2020-12-25 NOTE — ED CDU PROVIDER DISPOSITION NOTE - PATIENT PORTAL LINK FT
You can access the FollowMyHealth Patient Portal offered by [facility] by registering at the following website: [email or web address]. By joining [employer]’s FollowMyHealth portal, you will also be able to view your health information using other applications (apps) compatible with our system.

## 2020-12-25 NOTE — ED CDU PROVIDER INITIAL DAY NOTE - PROGRESS NOTE DETAILS
CDU PROGRESS NOTE SERGIO DANIELLE: Pt resting comfortably, NAD, VSS. Occasional PVCs, but No events on telemetry. Repeat labs w/ Potassium 4.0, Mg 2.3 and Troponin <6. TTE completed, will continue to monitor and Tele. CDU PROGRESS NOTE PA SHASHI: Pt resting comfortably, without complaint. NAD, VSS. No events on telemetry. TTE shows The left ventricular function is increased. The LVEF= 65-70%. The right ventricle is not well visualized. From limited views it appears normal. Will continue to monitor overnight on tele. Pt resting comfortably. NAD. No complaints. VSS. pt still feels palpitations as times, no chest pain, +frequent PVCs on monitor will cont to monitor, pending cards reeval in the AM. Used  #[id number] for assistance with encounter.

## 2020-12-25 NOTE — ED PROVIDER NOTE - PROGRESS NOTE DETAILS
Will treat UTI and consult cards, pt to stay in CDU for echo Spoke with house cards, they will see pt spoke w/ cdu, will come eval pt, ok to place order for obs but will need to wait for covid result. Spoke with house cards, they will see pt -> they have seen the pt and recommend TSH, echo, and holter monitor after dc from CDU

## 2020-12-25 NOTE — ED CDU PROVIDER INITIAL DAY NOTE - ATTENDING CONTRIBUTION TO CARE
74 yo F with HTN, HLD, s/p PVC ablation (4/17/2019) p/w 4 days of intermittent palpitations with occasional light headedness difficult for pt to characterize- states has some chronic light headedness. Denies cp/ fevers/ sob/ cough/ weakness/ vomiting/ diarrhea. EKG nonischemic with occasional PVCs, Pt well appearing axo3, nad, heart s1s2 no murmurs, lungs cta, abd soft ntnd, no peripheral edema, intact distal pulses. Vitals stable, on room air. Plan: will eval for underlying metabolic derangement, check cardiac enzymes, chest xray to eval for structural abnormality vs underlying infectious process, as well as UA. Tele monitor. As pt w/ prior hx requiring ablation, discussed w/ cards. plan for cdu for TTE and tele monitoring, possible holter placement.

## 2020-12-25 NOTE — ED PROVIDER NOTE - ATTENDING CONTRIBUTION TO CARE
Vera DO: I have personally performed a face to face medical and diagnostic evaluation of the patient. I have discussed with and reviewed the resident's note and agree with the History, ROS, Physical Exam and MDM unless otherwise indicated. A brief summary of my personal evaluation and impression can be found below.    76 yo F with HTN, HLD, s/p PVC ablation (4/17/2019) p/w 4 days of intermittent palpitations with occasional light headedness difficult for pt to characterize- states has some chronic light headedness. Denies cp/ fevers/ sob/ cough/ weakness/ vomiting/ diarrhea. EKG nonischemic with occasional PVCs, Pt well appearing axo3, nad, heart s1s2 no murmurs, lungs cta, abd soft ntnd, no peripheral edema, intact distal pulses. Vitals stable, on room air. Plan: will eval for underlying metabolic derangement, check cardiac enzymes, chest xray to eval for structural abnormality vs underlying infectious process, as well as UA. Tele monitor. As pt w/ prior hx requiring ablation, plan to discuss w/ cards. Likely cdu for TTE and tele monitoring as pt has poor outpt cardiology f/u.

## 2020-12-25 NOTE — CONSULT NOTE ADULT - SUBJECTIVE AND OBJECTIVE BOX
Patient seen and evaluated at bedside    Reason for consult: palpitations    HPI:  73F Mandarin speaking w/ HTN, HLD and symptomatic PVCs s/p PVC ablation on 4/17/2019 (known to our general and EP cardiology teams) who presented today with 2 days of palpitations. Patient has been followed by Lobo Maldonado and Rajan. After ablation, patient was seen in May 2019 for more palpitations, at which time she did not have events noted on outpatient monitors. Her palpitations subsided until this week when she started having progressively longer runs of palpitations starting 2 days ago, now lasted up to the majority of the day yesterday. She had mild shortness of breath and lightheadedness during the longest episode, but currently is asymptomatic. At baseline, she can walk around her house and do her ADLs independently. Her functional status has not changed.      PMHx:   Symptomatic PVCs s/p ablation in 2019    Hyperlipidemia    Hypertension      PSHx:   No significant past surgical history        Allergies:  penicillin (Anaphylaxis)      Home Meds:  · 	Aspir-Low 81 mg oral delayed release tablet: 1 tab(s) orally once a day  · 	amLODIPine 10 mg oral tablet: 1 tab(s) orally once a day  · 	simvastatin 20 mg oral tablet: 1 tab(s) orally once a day (at bedtime)  Current Medications:       FAMILY HISTORY:  Family history of hypertension (Father, Mother)        Social History:  Smoking History:  Alcohol Use:  Drug Use:    Review of Systems:  REVIEW OF SYSTEMS:  CONSTITUTIONAL: No weakness, fevers or chills  EYES/ENT: No visual changes;  No dysphagia  NECK: No pain or stiffness  RESPIRATORY: No cough, wheezing, hemoptysis; No shortness of breath  CARDIOVASCULAR: No chest pain; No lower extremity edema  GASTROINTESTINAL: No abdominal or epigastric pain. No nausea, vomiting, or hematemesis; No diarrhea or constipation. No melena or hematochezia.  BACK: No back pain  GENITOURINARY: No dysuria, frequency or hematuria  NEUROLOGICAL: No numbness or weakness  SKIN: No itching, burning, rashes, or lesions   All other review of systems is negative unless indicated above.    [x ] All other systems negative  [ ] Unable to assess ROS due to    Physical Exam:  T(F): 98.3 (12-25), Max: 98.3 (12-25)  HR: 82 (12-25) (82 - 105)  BP: 134/66 (12-25) (134/66 - 178/95)  RR: 15 (12-25)  SpO2: 98% (12-25)  GENERAL: No acute distress, well-developed  HEAD:  Atraumatic, Normocephalic  ENT: EOMI, PERRLA, conjunctiva and sclera clear, Neck supple, No JVD, moist mucosa  CHEST/LUNG: Clear to auscultation bilaterally; No wheeze, equal breath sounds bilaterally   BACK: No spinal tenderness  HEART: Regular rate and rhythm; No murmurs, rubs, or gallops  ABDOMEN: Soft, Nontender, Nondistended; Bowel sounds present  EXTREMITIES:  No clubbing, cyanosis, or edema  PSYCH: Nl behavior, nl affect  NEUROLOGY: AAOx3, non-focal, cranial nerves intact  SKIN: Normal color, No rashes or lesions      Cardiovascular Diagnostic Testing:    ECG:   In EMS: sinus w/ 2 PVCs  On tele in ED: sinus with intermittent PVCs  On ECG here: sinus without PVCs    Echo: Personally reviewed:  < from: Transthoracic Echocardiogram (04.29.19 @ 07:38) >  CONCLUSIONS:  1. Mitral annular calcification, otherwise normal mitral  valve.  2. Calcified trileaflet aortic valve with normal opening.  3. Increased relative wall thickness with normal left  ventricular mass index, consistent with concentric left  ventricular remodeling.  4. Endocardium not well visualized; grossly normal left  ventricular systolic function.  5. Normal right ventricular size and function.  6. Normal pericardium with no pericardial effusion.  ------------------------------------------------------------------------  Confirmed on  4/29/2019 - 15:13:16 by GUILLE Judd  ------------------------------------------------------------------------    < end of copied text >    Stress:  `< from: Nuclear Stress Test-Exercise (Nuclear Stress Test-Exercise .) (04.29.19 @ 10:45) >  PROCEDURE:  7.85 mCi of Tc 99m Tetrofosmin were injected during stress  protocol. Approximately 20 minutes later, tomographic  images were obtained in a 180 degree arc from right  anterior oblique to left anterior oblique with 64 stops.  The tomographic slices were reconstructed in 3 orthogonal  planes (short axis, horizontal long axis and vertical long  axis).  Interpretation was performed both by visual and  quantitative analysis.  Stress images were acquired using CZT-based system with  pinhole collimation ([employer], [employer]),  and reconstructed using MLEM algorithm. Images were  re-acquired with the patient in a prone position.  ------------------------------------------------------------------------  NUCLEAR FINDINGS:  Review of raw data shows: The study is of good technical  quality.  The left ventricle was small in size. Normal myocardial  perfusion scan,with no evidence of infarction or inducible  ischemia.  ------------------------------------------------------------------------  GATED ANALYSIS:  Post-stress gated wall motion analysis was performed (LVEF  > 70%;LVEDV = 46 ml.), revealing normal LV function. RV  function appeared normal.  ------------------------------------------------------------------------  IMPRESSIONS:Normal Study  * Myocardial Perfusion SPECT results are normal at 88 % of  MPHR.  * Review of raw data shows: The study is of good technical  quality.  * The left ventricle was small in size. Normal myocardial  perfusion scan,with no evidence of infarction or inducible  ischemia.  * Post-stress gated wall motion analysis was performed  (LVEF > 70%;LVEDV = 46 ml.), revealing normal LV function.  RV function appeared normal.  ------------------------------------------------------------------------  Confirmed on  4/29/2019 - 13:08:06 by Padilla Lee M.D.  ------------------------------------------------------------------------    < end of copied text >    Cath:  ``< from: Cardiac Cath Lab - Adult (09.17.18 @ 13:49) >  CORONARY VESSELS: The coronary circulation is right dominant.  LM:   --  LM: Normal.  LAD:   --  LAD: Normal.  CX:   --  Circumflex: Normal.  RCA:   --  RCA: Normal.  COMPLICATIONS: There were no complications.  DIAGNOSTIC RECOMMENDATIONS: The patient should continue with the present  medications.  Prepared and signed by  Linden Viramontes M.D.  Signed 09/19/2018 10:34:28  HEMODYNAMIC TABLES    < end of copied text >    Imaging:    CXR: Personally reviewed    Labs: Personally reviewed                        13.5   6.56  )-----------( 261      ( 25 Dec 2020 03:07 )             42.1     12-25    137  |  102  |  18  ----------------------------<  118<H>  3.3<L>   |  22  |  0.82    Ca    9.4      25 Dec 2020 03:07  Mg     2.1     12-25    TPro  7.1  /  Alb  4.3  /  TBili  0.3  /  DBili  x   /  AST  20  /  ALT  22  /  AlkPhos  76  12-25      Serum Pro-Brain Natriuretic Peptide: 26 pg/mL (12-25 @ 03:07)         Patient seen and evaluated at bedside    Reason for consult: palpitations    HPI:  73F Mandarin speaking w/ HTN, HLD and symptomatic PVCs s/p PVC ablation on 4/17/2019 (known to our general and EP cardiology teams) who presented today with 2 days of palpitations. Patient has been followed by Lobo Maldonado and Rajan. After ablation, patient was seen in May 2019 for more palpitations, at which time she did not have events noted on outpatient monitors. At that time, she had similar symptoms to today's presentation. Her palpitations subsided until this week when she started having progressively longer runs of palpitations starting 2 days ago, now lasted up to the majority of the day yesterday. She had mild shortness of breath and lightheadedness during the longest episode, but currently is asymptomatic. At baseline, she can walk around her house and do her ADLs independently. Her functional status has not changed.      PMHx:   Symptomatic PVCs s/p ablation in 2019    Hyperlipidemia    Hypertension      PSHx:   No significant past surgical history        Allergies:  penicillin (Anaphylaxis)      Home Meds:  · 	Aspir-Low 81 mg oral delayed release tablet: 1 tab(s) orally once a day  · 	amLODIPine 10 mg oral tablet: 1 tab(s) orally once a day  · 	simvastatin 20 mg oral tablet: 1 tab(s) orally once a day (at bedtime)  Current Medications:       FAMILY HISTORY:  Family history of hypertension (Father, Mother)        Social History:  Smoking History:  Alcohol Use:  Drug Use:    Review of Systems:  REVIEW OF SYSTEMS:  CONSTITUTIONAL: No weakness, fevers or chills  EYES/ENT: No visual changes;  No dysphagia  NECK: No pain or stiffness  RESPIRATORY: No cough, wheezing, hemoptysis; No shortness of breath  CARDIOVASCULAR: No chest pain; No lower extremity edema  GASTROINTESTINAL: No abdominal or epigastric pain. No nausea, vomiting, or hematemesis; No diarrhea or constipation. No melena or hematochezia.  BACK: No back pain  GENITOURINARY: No dysuria, frequency or hematuria  NEUROLOGICAL: No numbness or weakness  SKIN: No itching, burning, rashes, or lesions   All other review of systems is negative unless indicated above.    [x ] All other systems negative  [ ] Unable to assess ROS due to    Physical Exam:  T(F): 98.3 (12-25), Max: 98.3 (12-25)  HR: 82 (12-25) (82 - 105)  BP: 134/66 (12-25) (134/66 - 178/95)  RR: 15 (12-25)  SpO2: 98% (12-25)  GENERAL: No acute distress, well-developed  HEAD:  Atraumatic, Normocephalic  ENT: EOMI, PERRLA, conjunctiva and sclera clear, Neck supple, No JVD, moist mucosa  CHEST/LUNG: Clear to auscultation bilaterally; No wheeze, equal breath sounds bilaterally   BACK: No spinal tenderness  HEART: Regular rate and rhythm; No murmurs, rubs, or gallops  ABDOMEN: Soft, Nontender, Nondistended; Bowel sounds present  EXTREMITIES:  No clubbing, cyanosis, or edema  PSYCH: Nl behavior, nl affect  NEUROLOGY: AAOx3, non-focal, cranial nerves intact  SKIN: Normal color, No rashes or lesions      Cardiovascular Diagnostic Testing:    ECG:   In EMS: sinus w/ 2 PVCs  On tele in ED: sinus with intermittent PVCs  On ECG here: sinus without PVCs    Echo: Personally reviewed:  < from: Transthoracic Echocardiogram (04.29.19 @ 07:38) >  CONCLUSIONS:  1. Mitral annular calcification, otherwise normal mitral  valve.  2. Calcified trileaflet aortic valve with normal opening.  3. Increased relative wall thickness with normal left  ventricular mass index, consistent with concentric left  ventricular remodeling.  4. Endocardium not well visualized; grossly normal left  ventricular systolic function.  5. Normal right ventricular size and function.  6. Normal pericardium with no pericardial effusion.  ------------------------------------------------------------------------  Confirmed on  4/29/2019 - 15:13:16 by GUILLE Judd  ------------------------------------------------------------------------    < end of copied text >    Stress:  `< from: Nuclear Stress Test-Exercise (Nuclear Stress Test-Exercise .) (04.29.19 @ 10:45) >  PROCEDURE:  7.85 mCi of Tc 99m Tetrofosmin were injected during stress  protocol. Approximately 20 minutes later, tomographic  images were obtained in a 180 degree arc from right  anterior oblique to left anterior oblique with 64 stops.  The tomographic slices were reconstructed in 3 orthogonal  planes (short axis, horizontal long axis and vertical long  axis).  Interpretation was performed both by visual and  quantitative analysis.  Stress images were acquired using CZT-based system with  pinhole collimation ([employer], [employer]),  and reconstructed using MLEM algorithm. Images were  re-acquired with the patient in a prone position.  ------------------------------------------------------------------------  NUCLEAR FINDINGS:  Review of raw data shows: The study is of good technical  quality.  The left ventricle was small in size. Normal myocardial  perfusion scan,with no evidence of infarction or inducible  ischemia.  ------------------------------------------------------------------------  GATED ANALYSIS:  Post-stress gated wall motion analysis was performed (LVEF  > 70%;LVEDV = 46 ml.), revealing normal LV function. RV  function appeared normal.  ------------------------------------------------------------------------  IMPRESSIONS:Normal Study  * Myocardial Perfusion SPECT results are normal at 88 % of  MPHR.  * Review of raw data shows: The study is of good technical  quality.  * The left ventricle was small in size. Normal myocardial  perfusion scan,with no evidence of infarction or inducible  ischemia.  * Post-stress gated wall motion analysis was performed  (LVEF > 70%;LVEDV = 46 ml.), revealing normal LV function.  RV function appeared normal.  ------------------------------------------------------------------------  Confirmed on  4/29/2019 - 13:08:06 by Pdailla Lee M.D.  ------------------------------------------------------------------------    < end of copied text >    Cath:  ``< from: Cardiac Cath Lab - Adult (09.17.18 @ 13:49) >  CORONARY VESSELS: The coronary circulation is right dominant.  LM:   --  LM: Normal.  LAD:   --  LAD: Normal.  CX:   --  Circumflex: Normal.  RCA:   --  RCA: Normal.  COMPLICATIONS: There were no complications.  DIAGNOSTIC RECOMMENDATIONS: The patient should continue with the present  medications.  Prepared and signed by  Linden Viramontes M.D.  Signed 09/19/2018 10:34:28  HEMODYNAMIC TABLES    < end of copied text >    Imaging:    CXR: Personally reviewed    Labs: Personally reviewed                        13.5   6.56  )-----------( 261      ( 25 Dec 2020 03:07 )             42.1     12-25    137  |  102  |  18  ----------------------------<  118<H>  3.3<L>   |  22  |  0.82    Ca    9.4      25 Dec 2020 03:07  Mg     2.1     12-25    TPro  7.1  /  Alb  4.3  /  TBili  0.3  /  DBili  x   /  AST  20  /  ALT  22  /  AlkPhos  76  12-25      Serum Pro-Brain Natriuretic Peptide: 26 pg/mL (12-25 @ 03:07)         Patient seen and evaluated at bedside    Reason for consult: palpitations    HPI:  73F Mandarin speaking w/ HTN, HLD and symptomatic PVCs s/p PVC ablation on 4/17/2019 (known to our general and EP cardiology teams) who presented today with 2 days of palpitations. Patient has been followed by Lobo Maldonado and Rajan. After ablation, patient was seen in May 2019 for more palpitations, at which time she did not have events noted on outpatient monitors. At that time, she had similar symptoms to today's presentation. Her palpitations subsided until this week when she started having progressively longer runs of palpitations starting 2 days ago, now lasted up to the majority of the day yesterday. She had mild shortness of breath and lightheadedness during the longest episode, but currently is asymptomatic. At baseline, she can walk around her house and do her ADLs independently. Her functional status has not changed.      PMHx:   Symptomatic PVCs s/p ablation in 2019    Hyperlipidemia    Hypertension      PSHx:   No significant past surgical history        Allergies:  penicillin (Anaphylaxis)      Home Meds:  · 	Aspir-Low 81 mg oral delayed release tablet: 1 tab(s) orally once a day  · 	amLODIPine 10 mg oral tablet: 1 tab(s) orally once a day  · 	simvastatin 20 mg oral tablet: 1 tab(s) orally once a day (at bedtime)  Current Medications:       FAMILY HISTORY:  Family history of hypertension (Father, Mother)        Social History: NA    Review of Systems:  REVIEW OF SYSTEMS:  CONSTITUTIONAL: No weakness, fevers or chills  EYES/ENT: No visual changes;  No dysphagia  NECK: No pain or stiffness  RESPIRATORY: No cough, wheezing, hemoptysis; No shortness of breath  CARDIOVASCULAR: No chest pain; No lower extremity edema  GASTROINTESTINAL: No abdominal or epigastric pain. No nausea, vomiting, or hematemesis; No diarrhea or constipation. No melena or hematochezia.  BACK: No back pain  GENITOURINARY: No dysuria, frequency or hematuria  NEUROLOGICAL: No numbness or weakness  SKIN: No itching, burning, rashes, or lesions   All other review of systems is negative unless indicated above.    [x ] All other systems negative  [ ] Unable to assess ROS due to    Physical Exam:  T(F): 98.3 (12-25), Max: 98.3 (12-25)  HR: 82 (12-25) (82 - 105)  BP: 134/66 (12-25) (134/66 - 178/95)  RR: 15 (12-25)  SpO2: 98% (12-25)  GENERAL: No acute distress, well-developed  HEAD:  Atraumatic, Normocephalic  ENT: EOMI, PERRLA, conjunctiva and sclera clear, Neck supple, No JVD, moist mucosa  CHEST/LUNG: Clear to auscultation bilaterally; No wheeze, equal breath sounds bilaterally   BACK: No spinal tenderness  HEART: Regular rate and rhythm; No murmurs, rubs, or gallops  ABDOMEN: Soft, Nontender, Nondistended; Bowel sounds present  EXTREMITIES:  No clubbing, cyanosis, or edema  PSYCH: Nl behavior, nl affect  NEUROLOGY: AAOx3, non-focal, cranial nerves intact  SKIN: Normal color, No rashes or lesions      Cardiovascular Diagnostic Testing:    ECG:   In EMS: sinus w/ 2 PVCs  On tele in ED: sinus with intermittent PVCs  On ECG here: sinus without PVCs    Echo: Personally reviewed:  < from: Transthoracic Echocardiogram (04.29.19 @ 07:38) >  CONCLUSIONS:  1. Mitral annular calcification, otherwise normal mitral  valve.  2. Calcified trileaflet aortic valve with normal opening.  3. Increased relative wall thickness with normal left  ventricular mass index, consistent with concentric left  ventricular remodeling.  4. Endocardium not well visualized; grossly normal left  ventricular systolic function.  5. Normal right ventricular size and function.  6. Normal pericardium with no pericardial effusion.  ------------------------------------------------------------------------  Confirmed on  4/29/2019 - 15:13:16 by GUILLE Judd  ------------------------------------------------------------------------    < end of copied text >    Stress:  `< from: Nuclear Stress Test-Exercise (Nuclear Stress Test-Exercise .) (04.29.19 @ 10:45) >  PROCEDURE:  7.85 mCi of Tc 99m Tetrofosmin were injected during stress  protocol. Approximately 20 minutes later, tomographic  images were obtained in a 180 degree arc from right  anterior oblique to left anterior oblique with 64 stops.  The tomographic slices were reconstructed in 3 orthogonal  planes (short axis, horizontal long axis and vertical long  axis).  Interpretation was performed both by visual and  quantitative analysis.  Stress images were acquired using CZT-based system with  pinhole collimation ([employer], [employer]),  and reconstructed using MLEM algorithm. Images were  re-acquired with the patient in a prone position.  ------------------------------------------------------------------------  NUCLEAR FINDINGS:  Review of raw data shows: The study is of good technical  quality.  The left ventricle was small in size. Normal myocardial  perfusion scan,with no evidence of infarction or inducible  ischemia.  ------------------------------------------------------------------------  GATED ANALYSIS:  Post-stress gated wall motion analysis was performed (LVEF  > 70%;LVEDV = 46 ml.), revealing normal LV function. RV  function appeared normal.  ------------------------------------------------------------------------  IMPRESSIONS:Normal Study  * Myocardial Perfusion SPECT results are normal at 88 % of  MPHR.  * Review of raw data shows: The study is of good technical  quality.  * The left ventricle was small in size. Normal myocardial  perfusion scan,with no evidence of infarction or inducible  ischemia.  * Post-stress gated wall motion analysis was performed  (LVEF > 70%;LVEDV = 46 ml.), revealing normal LV function.  RV function appeared normal.  ------------------------------------------------------------------------  Confirmed on  4/29/2019 - 13:08:06 by Padilla Lee M.D.  ------------------------------------------------------------------------    < end of copied text >    Cath:  ``< from: Cardiac Cath Lab - Adult (09.17.18 @ 13:49) >  CORONARY VESSELS: The coronary circulation is right dominant.  LM:   --  LM: Normal.  LAD:   --  LAD: Normal.  CX:   --  Circumflex: Normal.  RCA:   --  RCA: Normal.  COMPLICATIONS: There were no complications.  DIAGNOSTIC RECOMMENDATIONS: The patient should continue with the present  medications.  Prepared and signed by  Linden Viramontes M.D.  Signed 09/19/2018 10:34:28  HEMODYNAMIC TABLES    < end of copied text >    Imaging:    CXR: Personally reviewed    Labs: Personally reviewed                        13.5   6.56  )-----------( 261      ( 25 Dec 2020 03:07 )             42.1     12-25    137  |  102  |  18  ----------------------------<  118<H>  3.3<L>   |  22  |  0.82    Ca    9.4      25 Dec 2020 03:07  Mg     2.1     12-25    TPro  7.1  /  Alb  4.3  /  TBili  0.3  /  DBili  x   /  AST  20  /  ALT  22  /  AlkPhos  76  12-25      Serum Pro-Brain Natriuretic Peptide: 26 pg/mL (12-25 @ 03:07)

## 2020-12-25 NOTE — ED CDU PROVIDER DISPOSITION NOTE - ATTENDING CONTRIBUTION TO CARE
I have personally performed a face to face medical and diagnostic evaluation of the patient. I have discussed with and reviewed the ACP's note and agree with the History, ROS, Physical Exam and MDM unless otherwise indicated. A brief summary of my personal evaluation and impression can be found below.    Pt is a 74 yo F with HTN, HLD, s/p PVC ablation (4/17/2019) who presents with palpitations. She says that for the past 4 days she has been having palpitations that come and go. Over the past day her palpitations have been getting worse. She has palpitations both at rest and exertion. Patient reports an increasing frequency of palpitations as well as lightheadedness immediately upon standing from a seated position.  She denies chest pain, syncope and lower extremity edema.  Denies fevers, chills, N/V, abd pain.   In ED, patient had laboratory significant for hypokalemia and Urine concerning for UTI. Pt was given Potassium 40mEq po and started on Macrobid 100mg. Pt was evaluated by Cardiology and sent to CDU for frequent reeval, vitals q 4hrs, tele monitoring, TTE, lytes correction and observation    ECHO with increased LVEF, pt still with intermittent palpitations in ED otherwise is comfortable, no pain. No other acute issues overnight.     VITALS: Initial triage and subsequent vitals have been reviewed by me.  GEN APPEARANCE: WDWN, alert, non-toxic, NAD  HEAD: Atraumatic.  EYES: PERRLa, EOMI, vision grossly intact.   NECK: Supple  CV: RRR, S1S2, no c/r/m/g. Cap refill <2 seconds. No bruits.   LUNGS: CTAB. No abnormal breath sounds.  ABDOMEN: Soft, NTND. No guarding or rebound.   MSK/EXT: No spinal or extremity point tenderness. No CVA ttp. Pelvis stable. No peripheral edema.  NEURO: Alert, follows commands. Weight bearing normal. Speech normal. Sensation and motor normal x4 extremities.   SKIN: Warm, dry and intact. No rash.  PSYCH: Appropriate    Plan/MDM: 75F HTN HLD s/p ablation presents with palpitations sent to CDU for echo and pending cards re-eval this morning. Still w intermittent palpitations however no actionable events on tele overnight, will re-discuss with cards this morning, trop neg x2. I have personally performed a face to face medical and diagnostic evaluation of the patient. I have discussed with and reviewed the ACP's note and agree with the History, ROS, Physical Exam and MDM unless otherwise indicated. A brief summary of my personal evaluation and impression can be found below.    Pt is a 76 yo F with HTN, HLD, s/p PVC ablation (4/17/2019) who presents with palpitations. She says that for the past 4 days she has been having palpitations that come and go. Over the past day her palpitations have been getting worse. She has palpitations both at rest and exertion. Patient reports an increasing frequency of palpitations as well as lightheadedness immediately upon standing from a seated position.  She denies chest pain, syncope and lower extremity edema.  Denies fevers, chills, N/V, abd pain.   In ED, patient had laboratory significant for hypokalemia and Urine concerning for UTI. Pt was given Potassium 40mEq po and started on Macrobid 100mg. Pt was evaluated by Cardiology and sent to CDU for frequent reeval, vitals q 4hrs, tele monitoring, TTE, lytes correction and observation    ECHO with increased LVEF, pt still with intermittent palpitations in ED otherwise is comfortable, no pain. No other acute issues overnight.     VITALS: Initial triage and subsequent vitals have been reviewed by me.  GEN APPEARANCE: WDWN, alert, non-toxic, NAD  HEAD: Atraumatic.  EYES: PERRLa, EOMI, vision grossly intact.   NECK: Supple  CV: RRR, S1S2, no c/r/m/g. Cap refill <2 seconds. No bruits.   LUNGS: CTAB. No abnormal breath sounds.  ABDOMEN: Soft, NTND. No guarding or rebound.   MSK/EXT: No spinal or extremity point tenderness. No CVA ttp. Pelvis stable. No peripheral edema.  NEURO: Alert, follows commands. Weight bearing normal. Speech normal. Sensation and motor normal x4 extremities.   SKIN: Warm, dry and intact. No rash.  PSYCH: Appropriate    Plan/MDM: 75F HTN HLD s/p ablation presents with palpitations sent to CDU for echo and pending cards re-eval this morning. Still w intermittent palpitations however no actionable events on tele overnight, noted intermittent pvc and brief run of luca, cardiology aware and reviewed strip, will re-discuss with cards this morning, trop neg x2.

## 2020-12-26 VITALS
HEART RATE: 56 BPM | RESPIRATION RATE: 18 BRPM | SYSTOLIC BLOOD PRESSURE: 99 MMHG | TEMPERATURE: 98 F | DIASTOLIC BLOOD PRESSURE: 59 MMHG | OXYGEN SATURATION: 96 %

## 2020-12-26 LAB
ANION GAP SERPL CALC-SCNC: 13 MMOL/L — SIGNIFICANT CHANGE UP (ref 5–17)
BUN SERPL-MCNC: 20 MG/DL — SIGNIFICANT CHANGE UP (ref 7–23)
CALCIUM SERPL-MCNC: 9.4 MG/DL — SIGNIFICANT CHANGE UP (ref 8.4–10.5)
CHLORIDE SERPL-SCNC: 105 MMOL/L — SIGNIFICANT CHANGE UP (ref 96–108)
CO2 SERPL-SCNC: 23 MMOL/L — SIGNIFICANT CHANGE UP (ref 22–31)
CREAT SERPL-MCNC: 0.71 MG/DL — SIGNIFICANT CHANGE UP (ref 0.5–1.3)
CULTURE RESULTS: SIGNIFICANT CHANGE UP
GLUCOSE SERPL-MCNC: 101 MG/DL — HIGH (ref 70–99)
MAGNESIUM SERPL-MCNC: 2.4 MG/DL — SIGNIFICANT CHANGE UP (ref 1.6–2.6)
POTASSIUM SERPL-MCNC: 4.2 MMOL/L — SIGNIFICANT CHANGE UP (ref 3.5–5.3)
POTASSIUM SERPL-SCNC: 4.2 MMOL/L — SIGNIFICANT CHANGE UP (ref 3.5–5.3)
SODIUM SERPL-SCNC: 141 MMOL/L — SIGNIFICANT CHANGE UP (ref 135–145)
SPECIMEN SOURCE: SIGNIFICANT CHANGE UP

## 2020-12-26 PROCEDURE — 82435 ASSAY OF BLOOD CHLORIDE: CPT

## 2020-12-26 PROCEDURE — 84132 ASSAY OF SERUM POTASSIUM: CPT

## 2020-12-26 PROCEDURE — 84295 ASSAY OF SERUM SODIUM: CPT

## 2020-12-26 PROCEDURE — 99235 HOSP IP/OBS SAME DATE MOD 70: CPT | Mod: GC

## 2020-12-26 PROCEDURE — 85018 HEMOGLOBIN: CPT

## 2020-12-26 PROCEDURE — G0378: CPT

## 2020-12-26 PROCEDURE — 80053 COMPREHEN METABOLIC PANEL: CPT

## 2020-12-26 PROCEDURE — 84484 ASSAY OF TROPONIN QUANT: CPT

## 2020-12-26 PROCEDURE — 83735 ASSAY OF MAGNESIUM: CPT

## 2020-12-26 PROCEDURE — 81001 URINALYSIS AUTO W/SCOPE: CPT

## 2020-12-26 PROCEDURE — 82947 ASSAY GLUCOSE BLOOD QUANT: CPT

## 2020-12-26 PROCEDURE — 71045 X-RAY EXAM CHEST 1 VIEW: CPT

## 2020-12-26 PROCEDURE — 99283 EMERGENCY DEPT VISIT LOW MDM: CPT | Mod: 25

## 2020-12-26 PROCEDURE — 99217: CPT | Mod: CS

## 2020-12-26 PROCEDURE — 83880 ASSAY OF NATRIURETIC PEPTIDE: CPT

## 2020-12-26 PROCEDURE — 93306 TTE W/DOPPLER COMPLETE: CPT

## 2020-12-26 PROCEDURE — 87635 SARS-COV-2 COVID-19 AMP PRB: CPT

## 2020-12-26 PROCEDURE — 80048 BASIC METABOLIC PNL TOTAL CA: CPT

## 2020-12-26 PROCEDURE — 82330 ASSAY OF CALCIUM: CPT

## 2020-12-26 PROCEDURE — 83605 ASSAY OF LACTIC ACID: CPT

## 2020-12-26 PROCEDURE — 87086 URINE CULTURE/COLONY COUNT: CPT

## 2020-12-26 PROCEDURE — 93005 ELECTROCARDIOGRAM TRACING: CPT

## 2020-12-26 PROCEDURE — 82803 BLOOD GASES ANY COMBINATION: CPT

## 2020-12-26 PROCEDURE — 85025 COMPLETE CBC W/AUTO DIFF WBC: CPT

## 2020-12-26 PROCEDURE — 85014 HEMATOCRIT: CPT

## 2020-12-26 PROCEDURE — 84443 ASSAY THYROID STIM HORMONE: CPT

## 2020-12-26 RX ORDER — METOPROLOL TARTRATE 50 MG
1 TABLET ORAL
Qty: 14 | Refills: 0
Start: 2020-12-26 | End: 2021-01-08

## 2020-12-26 RX ORDER — NITROFURANTOIN MACROCRYSTAL 50 MG
1 CAPSULE ORAL
Qty: 10 | Refills: 0
Start: 2020-12-26 | End: 2020-12-30

## 2020-12-26 RX ADMIN — Medication 25 MILLIGRAM(S): at 05:52

## 2020-12-26 RX ADMIN — Medication 100 MILLIGRAM(S): at 05:52

## 2020-12-26 RX ADMIN — Medication 20 MILLIGRAM(S): at 05:52

## 2020-12-26 NOTE — PROGRESS NOTE ADULT - ATTENDING COMMENTS
Ms. Cisneros was evaluated at the bedside ([employer] Mandarin  [id number]).  Her case was discussed with Dr. Vila.  I agree with the assessment and plan as documented above.

## 2020-12-26 NOTE — ED CDU PROVIDER SUBSEQUENT DAY NOTE - HISTORY
Pt resting comfortably. NAD. No complaints. VSS. +occasional PVS on tele, no cp, shortness of breath will cont to monitor.

## 2020-12-26 NOTE — ED ADULT NURSE REASSESSMENT NOTE - COMFORT CARE
ambulated to bathroom/meal provided/plan of care explained/po fluids offered
meal provided/plan of care explained/po fluids offered

## 2020-12-26 NOTE — ED ADULT NURSE REASSESSMENT NOTE - GENERAL PATIENT STATE
comfortable appearance/cooperative/improvement verbalized
comfortable appearance/cooperative/smiling/interactive

## 2020-12-26 NOTE — PROGRESS NOTE ADULT - ASSESSMENT
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    #PVC  -c/w Metoprolol 25mg BID. TTE reviewed with no structural abnormalities  -Maintain K>4 and Mg>2  -Outpt f/u with Dr. Joel Vila PGY5  [phone]  All Cardiology service information can be found 24/7 on [email or web address], password: [email or web address]

## 2020-12-26 NOTE — ED CDU PROVIDER SUBSEQUENT DAY NOTE - ATTENDING CONTRIBUTION TO CARE
I have personally performed a face to face medical and diagnostic evaluation of the patient. I have discussed with and reviewed the ACP's note and agree with the History, ROS, Physical Exam and MDM unless otherwise indicated. A brief summary of my personal evaluation and impression can be found below.    Please see CDU Dispo note.

## 2020-12-26 NOTE — PROGRESS NOTE ADULT - SUBJECTIVE AND OBJECTIVE BOX
Tutu Vila  [phone]  All Cardiology service information can be found 24/7 on [email or web address], password: [email or web address]    Overnight Events: No events overnight. Pt feels well, denies any chest pain, sob, or palpitations.     CONSTITUTIONAL:  No fevers or chills  HEENT: No rhinorrhea   SKIN:  No rash or itching.  CARDIOVASCULAR:  No chest pain, chest pressure or chest discomfort. No palpitations or edema.  RESPIRATORY:  No shortness of breath, cough or sputum.  GASTROINTESTINAL:  No nausea, vomiting or diarrhea. No abdominal pain.   GENITOURINARY:  Denies hematuria, dysuria.   NEUROLOGICAL:  No headache, dizziness, syncope.   MUSCULOSKELETAL:  No muscle, back pain, joint pain or stiffness.  HEMATOLOGIC:  No bleeding or bruising.          Current Meds:  aspirin enteric coated 81 milliGRAM(s) Oral daily  enalapril 20 milliGRAM(s) Oral daily  metoprolol tartrate 25 milliGRAM(s) Oral two times a day  nitrofurantoin monohydrate/macrocrystals (MACROBID) 100 milliGRAM(s) Oral two times a day  simvastatin 20 milliGRAM(s) Oral at bedtime      Vitals:  T(F): 98.2 (12-26), Max: 98.6 (12-25)  HR: 56 (12-26) (56 - 83)  BP: 99/59 (12-26) (99/59 - 156/76)  RR: 18 (12-26)  SpO2: 96% (12-26)  I&O's Summary      Physical Exam:  Appearance: No acute distress  Eyes: PERRL, EOMI, pink conjunctiva  HEENT: Normal oral mucosa  Cardiovascular: S1, S2, irregular, no murmurs   Respiratory: Clear to auscultation bilaterally  Gastrointestinal: soft, non-tender, non-distended with normal bowel sounds  Musculoskeletal: No clubbing; no joint deformity   Neurologic: Non-focal  Lymphatic: No lymphadenopathy  Psychiatry: AAOx3, mood & affect appropriate  Skin: No rashes, ecchymoses, or cyanosis                          13.5   6.56  )-----------( 261      ( 25 Dec 2020 03:07 )             42.1     12-26    141  |  105  |  20  ----------------------------<  101<H>  4.2   |  23  |  0.71    Ca    9.4      26 Dec 2020 06:28  Mg     2.4     12-26    TPro  7.1  /  Alb  4.3  /  TBili  0.3  /  DBili  x   /  AST  20  /  ALT  22  /  AlkPhos  76  12-25      CARDIAC MARKERS ( 25 Dec 2020 11:40 )  <6 ng/L / x     / x     / x     / x     / x      CARDIAC MARKERS ( 25 Dec 2020 03:07 )  <6 ng/L / x     / x     / x     / x     / x          Serum Pro-Brain Natriuretic Peptide: 26 pg/mL (12-25 @ 03:07)      Echo:    < from: Transthoracic Echocardiogram (12.25.20 @ 10:32) >  CONCLUSIONS:  1. The left ventricular function is increased.  The LVEF=  65-70%.  2. The right ventricle is not well visualized.  From  limited views it appears normal.  *** No previous Echo exam.  Technically difficult study.    < end of copied text >    Interpretation of Telemetry: SR, PVC, [place]

## 2020-12-26 NOTE — ED CDU PROVIDER SUBSEQUENT DAY NOTE - PROGRESS NOTE DETAILS
Pt resting comfortably. NAD. No complaints. VSS. no cp/aoc, occasional PVCs on tele will cont to monitor.

## 2020-12-26 NOTE — ED CDU PROVIDER SUBSEQUENT DAY NOTE - PHYSICAL EXAMINATION
GENERAL: Patient awake alert NAD.  HEENT: NC/AT, Moist mucous membranes, PERRL, EOMI.  LUNGS: CTAB, no wheezes or crackles.   CARDIAC: RRR, no m/r/g.    ABDOMEN: Soft, NT, ND, No rebound, guarding. No CVA tenderness.   EXT: No edema. No calf tenderness.   MSK: no pain with movement, no deformities.  NEURO: A&Ox3. Moving all extremities.  SKIN: Warm and dry. No rash.  PSYCH: Normal affect.

## 2020-12-26 NOTE — ED ADULT NURSE REASSESSMENT NOTE - NS ED NURSE REASSESS COMMENT FT1
Pt report received from Michael HERZOG. Pt transferred to [facility] 5 for TTE due to palpitations X4 days. Pt a/ox3 denies respiratory distress, sob, dyspnea, C/P, palpitations, n/v/d at this time. Pt states symptoms have improved.  VSS, afebrile, IV clean/dry/intact. Pt aware of plan of care, call bell within reach ,safety maintained. Will monitor and assess.
Report taken from Syl HERZOG. States patient had a good night with no complaints. Will continue to monitor.
Pt received from MINO Fink. Pt oriented to CDU & plan of care was discussed. Pt denies any chest pain or SOB. Pt denies any dizziness when stationary but endorses dizziness when ambulation. Pt aware to notify RN or PA of any lightheadedness/ dizziness prior to ambulation. Pt still experiencing palpitations. Safety & comfort measures maintained. Call bell in reach. Will continue to monitor.

## 2021-01-05 ENCOUNTER — APPOINTMENT (OUTPATIENT)
Dept: MRI IMAGING | Facility: CLINIC | Age: 76
End: 2021-01-05

## 2021-01-05 ENCOUNTER — APPOINTMENT (OUTPATIENT)
Dept: ULTRASOUND IMAGING | Facility: CLINIC | Age: 76
End: 2021-01-05

## 2021-01-07 ENCOUNTER — NON-APPOINTMENT (OUTPATIENT)
Age: 76
End: 2021-01-07

## 2021-01-07 ENCOUNTER — APPOINTMENT (OUTPATIENT)
Dept: CARDIOLOGY | Facility: CLINIC | Age: 76
End: 2021-01-07
Payer: MEDICARE

## 2021-01-07 VITALS — BODY MASS INDEX: 24.03 KG/M2 | WEIGHT: 140 LBS

## 2021-01-07 VITALS
OXYGEN SATURATION: 95 % | DIASTOLIC BLOOD PRESSURE: 74 MMHG | HEART RATE: 61 BPM | HEIGHT: 64 IN | BODY MASS INDEX: 24.03 KG/M2 | TEMPERATURE: 96.6 F | SYSTOLIC BLOOD PRESSURE: 126 MMHG

## 2021-01-07 PROCEDURE — 93000 ELECTROCARDIOGRAM COMPLETE: CPT

## 2021-01-07 PROCEDURE — 99214 OFFICE O/P EST MOD 30 MIN: CPT

## 2021-03-24 ENCOUNTER — APPOINTMENT (OUTPATIENT)
Dept: CARDIOLOGY | Facility: CLINIC | Age: 76
End: 2021-03-24
Payer: MEDICARE

## 2021-03-24 ENCOUNTER — NON-APPOINTMENT (OUTPATIENT)
Age: 76
End: 2021-03-24

## 2021-03-24 VITALS
DIASTOLIC BLOOD PRESSURE: 72 MMHG | HEIGHT: 62 IN | BODY MASS INDEX: 26.5 KG/M2 | SYSTOLIC BLOOD PRESSURE: 148 MMHG | WEIGHT: 144 LBS | HEART RATE: 63 BPM | TEMPERATURE: 97.4 F | OXYGEN SATURATION: 98 %

## 2021-03-24 PROCEDURE — 99072 ADDL SUPL MATRL&STAF TM PHE: CPT

## 2021-03-24 PROCEDURE — 93000 ELECTROCARDIOGRAM COMPLETE: CPT

## 2021-03-24 PROCEDURE — 99214 OFFICE O/P EST MOD 30 MIN: CPT

## 2021-04-13 ENCOUNTER — NON-APPOINTMENT (OUTPATIENT)
Age: 76
End: 2021-04-13

## 2021-04-13 ENCOUNTER — APPOINTMENT (OUTPATIENT)
Dept: ELECTROPHYSIOLOGY | Facility: CLINIC | Age: 76
End: 2021-04-13
Payer: MEDICARE

## 2021-04-13 VITALS
OXYGEN SATURATION: 96 % | HEART RATE: 66 BPM | DIASTOLIC BLOOD PRESSURE: 83 MMHG | SYSTOLIC BLOOD PRESSURE: 132 MMHG | TEMPERATURE: 97 F | BODY MASS INDEX: 27.44 KG/M2 | HEIGHT: 62 IN

## 2021-04-13 VITALS — BODY MASS INDEX: 27.44 KG/M2 | WEIGHT: 150 LBS

## 2021-04-13 PROCEDURE — 99072 ADDL SUPL MATRL&STAF TM PHE: CPT

## 2021-04-13 PROCEDURE — 99215 OFFICE O/P EST HI 40 MIN: CPT

## 2021-04-13 PROCEDURE — 93000 ELECTROCARDIOGRAM COMPLETE: CPT

## 2021-04-13 NOTE — REVIEW OF SYSTEMS
[Dizziness] : dizziness [Negative] : Heme/Lymph [Chest  Pressure] : chest pressure [Palpitations] : palpitations

## 2021-04-14 NOTE — DISCUSSION/SUMMARY
[FreeTextEntry1] : Diana Cisneros is a 76 y/o woman, Mandarin speaking, with Hx of HTN, HLD and symptomatic PVCs s/p AMC PVC ablation on 4/17/2019 who presents today for routine f/u. \par \par Impression:\par \par 1. PVCs: s/p AMC PVC ablation on 4/17/2019. EKG performed today to assess for presence of recurrent PVCs and reveals NSR with PVCs. In office, she does not feel PVCs at present. Feels them more often in the AM and then takes her metoprolol with improvement. If the PVCs become worse, can consider alternative medication/possible antiarrhythmics for PVC suppression vs possible ablation. Given bradycardia at baseline and desire to avoid further medication, patient and daughter want to consider PVC ablation. Risks, benefits, and alternatives to procedure discussed at length. Risks including that of bleeding, infection, stroke, and cardiac tamponade discussed and he verbalizes understanding of all. Since procedure will likely not be until a few months given scheduling issues, consider initiating mexiletine 150mg TID for improved PVC suppression in the interim. Rx provided. Will hold all medications the morning of the ablation. May take all regularly scheduled medications the night before. Should hold mexiletine x1 week and metoprolol x2 days prior to procedure. \par \par 2. HTN: resume oral antihypertensives as prescribed. Encouraged heart healthy diet, sodium restriction, and weight loss. Continue regular f/u with Cardiologist for further HTN management.\par \par 3. HLD: resume statin therapy as prescribed and regular f/u with Cardiologist for routine lipid monitoring and management.\par \par Plan for PVC ablation. \par \par Sincerely,\par \par Cedric Maldonado MD

## 2021-04-14 NOTE — HISTORY OF PRESENT ILLNESS
[FreeTextEntry1] : Tucker Tolentino MD\par \par Diana Cisneros is a 76 y/o woman, Mandarin speaking, with Hx of HTN, HLD and symptomatic PVCs s/p AMC PVC ablation on 4/17/2019 who presents today for routine f/u. Has been feeling PVCs again more frequently and was seen in the hospital back in December and was given metoprolol with some relief. Has been maintained on metoprolol tart 12.5mg BID with symptom improvement. Last ECHO 12/2020 with normal LVEF. When having frequent PVCs, she feels more dizzy and chest discomfort but has had improvement now with metoprolol use. Denies chest pain, SOB, syncope or near syncope.\par

## 2021-04-15 ENCOUNTER — NON-APPOINTMENT (OUTPATIENT)
Age: 76
End: 2021-04-15

## 2021-06-04 ENCOUNTER — OUTPATIENT (OUTPATIENT)
Dept: OUTPATIENT SERVICES | Facility: HOSPITAL | Age: 76
LOS: 1 days | End: 2021-06-04

## 2021-06-04 VITALS
RESPIRATION RATE: 18 BRPM | DIASTOLIC BLOOD PRESSURE: 78 MMHG | WEIGHT: 147.05 LBS | HEIGHT: 63 IN | SYSTOLIC BLOOD PRESSURE: 148 MMHG | OXYGEN SATURATION: 98 % | HEART RATE: 71 BPM | TEMPERATURE: 99 F

## 2021-06-04 DIAGNOSIS — I49.3 VENTRICULAR PREMATURE DEPOLARIZATION: ICD-10-CM

## 2021-06-04 DIAGNOSIS — Z01.812 ENCOUNTER FOR PREPROCEDURAL LABORATORY EXAMINATION: ICD-10-CM

## 2021-06-04 LAB
ALBUMIN SERPL ELPH-MCNC: 4.5 G/DL — SIGNIFICANT CHANGE UP (ref 3.3–5)
ALP SERPL-CCNC: 72 U/L — SIGNIFICANT CHANGE UP (ref 40–120)
ALT FLD-CCNC: 27 U/L — SIGNIFICANT CHANGE UP (ref 4–33)
ANION GAP SERPL CALC-SCNC: 10 MMOL/L — SIGNIFICANT CHANGE UP (ref 7–14)
AST SERPL-CCNC: 20 U/L — SIGNIFICANT CHANGE UP (ref 4–32)
BILIRUB SERPL-MCNC: 0.3 MG/DL — SIGNIFICANT CHANGE UP (ref 0.2–1.2)
BLD GP AB SCN SERPL QL: NEGATIVE — SIGNIFICANT CHANGE UP
BUN SERPL-MCNC: 13 MG/DL — SIGNIFICANT CHANGE UP (ref 7–23)
CALCIUM SERPL-MCNC: 9.3 MG/DL — SIGNIFICANT CHANGE UP (ref 8.4–10.5)
CHLORIDE SERPL-SCNC: 105 MMOL/L — SIGNIFICANT CHANGE UP (ref 98–107)
CO2 SERPL-SCNC: 25 MMOL/L — SIGNIFICANT CHANGE UP (ref 22–31)
CREAT SERPL-MCNC: 0.72 MG/DL — SIGNIFICANT CHANGE UP (ref 0.5–1.3)
GLUCOSE SERPL-MCNC: 97 MG/DL — SIGNIFICANT CHANGE UP (ref 70–99)
HCT VFR BLD CALC: 43.7 % — SIGNIFICANT CHANGE UP (ref 34.5–45)
HGB BLD-MCNC: 13.7 G/DL — SIGNIFICANT CHANGE UP (ref 11.5–15.5)
MCHC RBC-ENTMCNC: 28.6 PG — SIGNIFICANT CHANGE UP (ref 27–34)
MCHC RBC-ENTMCNC: 31.4 GM/DL — LOW (ref 32–36)
MCV RBC AUTO: 91.2 FL — SIGNIFICANT CHANGE UP (ref 80–100)
NRBC # BLD: 0 /100 WBCS — SIGNIFICANT CHANGE UP
NRBC # FLD: 0 K/UL — SIGNIFICANT CHANGE UP
PLATELET # BLD AUTO: 277 K/UL — SIGNIFICANT CHANGE UP (ref 150–400)
POTASSIUM SERPL-MCNC: 4.2 MMOL/L — SIGNIFICANT CHANGE UP (ref 3.5–5.3)
POTASSIUM SERPL-SCNC: 4.2 MMOL/L — SIGNIFICANT CHANGE UP (ref 3.5–5.3)
PROT SERPL-MCNC: 7.9 G/DL — SIGNIFICANT CHANGE UP (ref 6–8.3)
RBC # BLD: 4.79 M/UL — SIGNIFICANT CHANGE UP (ref 3.8–5.2)
RBC # FLD: 12.2 % — SIGNIFICANT CHANGE UP (ref 10.3–14.5)
RH IG SCN BLD-IMP: POSITIVE — SIGNIFICANT CHANGE UP
SODIUM SERPL-SCNC: 140 MMOL/L — SIGNIFICANT CHANGE UP (ref 135–145)
WBC # BLD: 5.56 K/UL — SIGNIFICANT CHANGE UP (ref 3.8–10.5)
WBC # FLD AUTO: 5.56 K/UL — SIGNIFICANT CHANGE UP (ref 3.8–10.5)

## 2021-06-04 RX ORDER — AMLODIPINE BESYLATE 2.5 MG/1
1 TABLET ORAL
Qty: 0 | Refills: 0 | DISCHARGE

## 2021-06-04 RX ORDER — SIMVASTATIN 20 MG/1
1 TABLET, FILM COATED ORAL
Qty: 0 | Refills: 0 | DISCHARGE

## 2021-06-04 RX ORDER — ASPIRIN/CALCIUM CARB/MAGNESIUM 324 MG
1 TABLET ORAL
Qty: 0 | Refills: 0 | DISCHARGE

## 2021-06-04 NOTE — H&P PST ADULT - NSANTHOSAYNRD_GEN_A_CORE
No. JUDITH screening performed.  STOP BANG Legend: 0-2 = LOW Risk; 3-4 = INTERMEDIATE Risk; 5-8 = HIGH Risk

## 2021-06-04 NOTE — H&P PST ADULT - NSICDXPASTMEDICALHX_GEN_ALL_CORE_FT
PAST MEDICAL HISTORY:  Hyperlipidemia     Hypertension     Symptomatic PVCs s/p AMC PVC ablation 4/17/21

## 2021-06-04 NOTE — H&P PST ADULT - HISTORY OF PRESENT ILLNESS
74y/o female presents for preop eval for scheduled   Pt with h/o HTN, HLD.  C/o feeling dizzy and chest discomfort few months ago.  Dx with PVC's s/p AMC PVC ablation on 4/17/2021  74y/o female presents for preop eval for scheduled complex ablation.   Pt with h/o HTN, HLD.  C/o feeling dizzy and chest discomfort few months ago.  Dx with PVC's s/p AMC PVC ablation on 4/17/2021.  Pt is Mandarin speaking.

## 2021-06-04 NOTE — H&P PST ADULT - NSICDXPROBLEM_GEN_ALL_CORE_FT
PROBLEM DIAGNOSES  Problem: Encounter for preprocedure screening laboratory testing for COVID-19  Assessment and Plan:        PROBLEM DIAGNOSES  Problem: Encounter for preprocedure screening laboratory testing for COVID-19  Assessment and Plan: per pt scheduled within 72 hrs of this surgery     Problem: Ventricular premature depolarization  Assessment and Plan: scheduled for complex ablation on 6/16/2021  Pt instructed to fololw preop instructions from Dr Mandel office and to direct any questions regarding preop instructions to his office.  Pt & daughter - Johnny verbalized understanding

## 2021-06-12 DIAGNOSIS — Z01.818 ENCOUNTER FOR OTHER PREPROCEDURAL EXAMINATION: ICD-10-CM

## 2021-06-13 ENCOUNTER — APPOINTMENT (OUTPATIENT)
Dept: DISASTER EMERGENCY | Facility: CLINIC | Age: 76
End: 2021-06-13

## 2021-06-13 LAB — SARS-COV-2 N GENE NPH QL NAA+PROBE: NOT DETECTED

## 2021-06-16 ENCOUNTER — INPATIENT (INPATIENT)
Facility: HOSPITAL | Age: 76
LOS: 0 days | Discharge: HOME | End: 2021-06-17
Attending: INTERNAL MEDICINE | Admitting: INTERNAL MEDICINE
Payer: MEDICARE

## 2021-06-16 VITALS
SYSTOLIC BLOOD PRESSURE: 157 MMHG | OXYGEN SATURATION: 98 % | DIASTOLIC BLOOD PRESSURE: 81 MMHG | RESPIRATION RATE: 16 BRPM | HEART RATE: 78 BPM | TEMPERATURE: 98 F

## 2021-06-16 DIAGNOSIS — I49.3 VENTRICULAR PREMATURE DEPOLARIZATION: ICD-10-CM

## 2021-06-16 PROCEDURE — 93655 ICAR CATH ABLTJ DSCRT ARRHYT: CPT

## 2021-06-16 PROCEDURE — 93623 PRGRMD STIMJ&PACG IV RX NFS: CPT | Mod: 26

## 2021-06-16 PROCEDURE — 93010 ELECTROCARDIOGRAM REPORT: CPT | Mod: 76,77

## 2021-06-16 PROCEDURE — 93654 COMPRE EP EVAL TX VT: CPT

## 2021-06-16 PROCEDURE — 93662 INTRACARDIAC ECG (ICE): CPT | Mod: 26

## 2021-06-16 PROCEDURE — 93010 ELECTROCARDIOGRAM REPORT: CPT

## 2021-06-16 RX ORDER — COLCHICINE 0.6 MG
0.6 TABLET ORAL
Refills: 0 | Status: DISCONTINUED | OUTPATIENT
Start: 2021-06-16 | End: 2021-06-17

## 2021-06-16 RX ORDER — FUROSEMIDE 40 MG
20 TABLET ORAL ONCE
Refills: 0 | Status: COMPLETED | OUTPATIENT
Start: 2021-06-16 | End: 2021-06-16

## 2021-06-16 RX ORDER — ASPIRIN/CALCIUM CARB/MAGNESIUM 324 MG
1 TABLET ORAL
Qty: 0 | Refills: 0 | DISCHARGE

## 2021-06-16 RX ORDER — MEXILETINE HYDROCHLORIDE 150 MG/1
150 CAPSULE ORAL 3 TIMES DAILY
Qty: 90 | Refills: 1 | Status: DISCONTINUED | COMMUNITY
Start: 2021-04-13 | End: 2021-06-16

## 2021-06-16 RX ORDER — METOPROLOL TARTRATE 50 MG
12.5 TABLET ORAL
Refills: 0 | Status: DISCONTINUED | OUTPATIENT
Start: 2021-06-16 | End: 2021-06-17

## 2021-06-16 RX ORDER — METOPROLOL TARTRATE 50 MG
0.5 TABLET ORAL
Qty: 0 | Refills: 0 | DISCHARGE

## 2021-06-16 RX ORDER — SODIUM CHLORIDE 9 MG/ML
3 INJECTION INTRAMUSCULAR; INTRAVENOUS; SUBCUTANEOUS EVERY 8 HOURS
Refills: 0 | Status: DISCONTINUED | OUTPATIENT
Start: 2021-06-16 | End: 2021-06-17

## 2021-06-16 RX ORDER — ASPIRIN/CALCIUM CARB/MAGNESIUM 324 MG
81 TABLET ORAL DAILY
Refills: 0 | Status: DISCONTINUED | OUTPATIENT
Start: 2021-06-16 | End: 2021-06-17

## 2021-06-16 RX ORDER — ATORVASTATIN CALCIUM 80 MG/1
40 TABLET, FILM COATED ORAL AT BEDTIME
Refills: 0 | Status: DISCONTINUED | OUTPATIENT
Start: 2021-06-16 | End: 2021-06-17

## 2021-06-16 RX ADMIN — ATORVASTATIN CALCIUM 40 MILLIGRAM(S): 80 TABLET, FILM COATED ORAL at 22:41

## 2021-06-16 RX ADMIN — Medication 20 MILLIGRAM(S): at 22:41

## 2021-06-16 RX ADMIN — SODIUM CHLORIDE 3 MILLILITER(S): 9 INJECTION INTRAMUSCULAR; INTRAVENOUS; SUBCUTANEOUS at 14:52

## 2021-06-16 RX ADMIN — SODIUM CHLORIDE 3 MILLILITER(S): 9 INJECTION INTRAMUSCULAR; INTRAVENOUS; SUBCUTANEOUS at 22:41

## 2021-06-16 NOTE — CHART NOTE - NSCHARTNOTEFT_GEN_A_CORE
Type of Procedure: Ablation of premature ventricular complexes  Licensed independent practitioner: Cedric Maldonado MD  Assistant: none  Description of procedure: right femoral arterial and venous access obtained, PVCs were mapped to the anterior portion of the RVOT (LBIA PVCs) via pace mapping.  Next a PVC induced by dobutamine 40 mkm was mapped to the distal GCV/AIV.  Ablation catheter reached just short of the distal GCV/AIV. Ablation was attempted at the distal GCV and an area of AMC myocardium that was within 5 mm of the distal GCV; also the earliest activation at the left coronary cusp was ablated; the hope was by ablating this RBIA PVC from distal GCV, left coronary cusp and AMC might eliminate the PVC. Several other PVC morphologies were induced with dobutamine 40 mkm, but none thought to be clinical.  The RVOT PVC was thought to be clinical.   A representative from [employer] was present to help operate a sophisticated mapping system.   Findings of procedure: suppression of PVCs with RF ablation  Estimated blood loss: < 10 cc  Specimen removed: none  Preoperative Dx: premature ventricular complexes,   Postoperative Dx: premature ventricular complexes,   Complications: none  Anesthesia type: local with sedation  No heparin for 24 hours and then reassess.    Cedric Maldonado MD.

## 2021-06-16 NOTE — CHART NOTE - NSCHARTNOTEFT_GEN_A_CORE
The patient presents today for elective PVC ablation.  see hard copy of H&P from [employer] and PST in patient's chart.   The patient denies chest pain, SOB, palpitations, dizziness, presyncope, syncope,  headache, visual disturbances, CVA, PE, DVT, JUDITH, abdominal pain, N/V/D/C, hematochezia, melena, dysuria, hematuria, fever, chills.  Medications reviewed.

## 2021-06-16 NOTE — CHART NOTE - NSCHARTNOTEFT_GEN_A_CORE
Patient is s/p PVC ablation. Old blood stain noted on right groin dressing. No hematoma noted. Patient denies pain. Positive pedal pulses. Will continue to monitor.

## 2021-06-16 NOTE — CHART NOTE - NSCHARTNOTEFT_GEN_A_CORE
The patient is s/p PVC ablation, c/o 4/10 L sided chest pain.   VSS  Heart - HR - regular, + S1, S2  Lungs = clear to auscultation  ECG post procedure x 2 - no new changes.   Dr. Maldonado was made aware, no need for further intervention, recommended to continue observation. Will admit the patient. Patient's daughter was made aware over the phoe.   The patient still did not void, but claims that doesn't want to urinate yet.   Mandarin  #[id number] used for translation. The patient is s/p PVC ablation, c/o 4/10 L sided chest pain.   VSS  Heart - HR - regular, + S1, S2  Lungs -  clear to auscultation  ECG post procedure x 2 - no new changes.   Dr. Maldonado was made aware, no need for further intervention, recommended to continue observation. Will admit the patient. Patient's daughter was made aware over the phone.   The patient still did not void, but claims that doesn't want to urinate yet.     (Mandarin  #[id number] used for translation).

## 2021-06-17 ENCOUNTER — TRANSCRIPTION ENCOUNTER (OUTPATIENT)
Age: 76
End: 2021-06-17

## 2021-06-17 VITALS
OXYGEN SATURATION: 100 % | SYSTOLIC BLOOD PRESSURE: 155 MMHG | RESPIRATION RATE: 16 BRPM | DIASTOLIC BLOOD PRESSURE: 68 MMHG | TEMPERATURE: 98 F | HEART RATE: 67 BPM

## 2021-06-17 LAB
ANION GAP SERPL CALC-SCNC: 15 MMOL/L — HIGH (ref 7–14)
BUN SERPL-MCNC: 17 MG/DL — SIGNIFICANT CHANGE UP (ref 7–23)
CALCIUM SERPL-MCNC: 9.5 MG/DL — SIGNIFICANT CHANGE UP (ref 8.4–10.5)
CHLORIDE SERPL-SCNC: 101 MMOL/L — SIGNIFICANT CHANGE UP (ref 98–107)
CO2 SERPL-SCNC: 22 MMOL/L — SIGNIFICANT CHANGE UP (ref 22–31)
COVID-19 SPIKE DOMAIN AB INTERP: NEGATIVE — SIGNIFICANT CHANGE UP
COVID-19 SPIKE DOMAIN ANTIBODY RESULT: 0.4 U/ML — SIGNIFICANT CHANGE UP
CREAT SERPL-MCNC: 0.69 MG/DL — SIGNIFICANT CHANGE UP (ref 0.5–1.3)
GLUCOSE SERPL-MCNC: 108 MG/DL — HIGH (ref 70–99)
HCT VFR BLD CALC: 40.3 % — SIGNIFICANT CHANGE UP (ref 34.5–45)
HGB BLD-MCNC: 12.6 G/DL — SIGNIFICANT CHANGE UP (ref 11.5–15.5)
MAGNESIUM SERPL-MCNC: 2.1 MG/DL — SIGNIFICANT CHANGE UP (ref 1.6–2.6)
MCHC RBC-ENTMCNC: 28.1 PG — SIGNIFICANT CHANGE UP (ref 27–34)
MCHC RBC-ENTMCNC: 31.3 GM/DL — LOW (ref 32–36)
MCV RBC AUTO: 89.8 FL — SIGNIFICANT CHANGE UP (ref 80–100)
NRBC # BLD: 0 /100 WBCS — SIGNIFICANT CHANGE UP
NRBC # FLD: 0 K/UL — SIGNIFICANT CHANGE UP
PHOSPHATE SERPL-MCNC: 3.1 MG/DL — SIGNIFICANT CHANGE UP (ref 2.5–4.5)
PLATELET # BLD AUTO: 217 K/UL — SIGNIFICANT CHANGE UP (ref 150–400)
POTASSIUM SERPL-MCNC: 3.8 MMOL/L — SIGNIFICANT CHANGE UP (ref 3.5–5.3)
POTASSIUM SERPL-SCNC: 3.8 MMOL/L — SIGNIFICANT CHANGE UP (ref 3.5–5.3)
RBC # BLD: 4.49 M/UL — SIGNIFICANT CHANGE UP (ref 3.8–5.2)
RBC # FLD: 12.2 % — SIGNIFICANT CHANGE UP (ref 10.3–14.5)
SARS-COV-2 IGG+IGM SERPL QL IA: 0.4 U/ML — SIGNIFICANT CHANGE UP
SARS-COV-2 IGG+IGM SERPL QL IA: NEGATIVE — SIGNIFICANT CHANGE UP
SODIUM SERPL-SCNC: 138 MMOL/L — SIGNIFICANT CHANGE UP (ref 135–145)
WBC # BLD: 10.19 K/UL — SIGNIFICANT CHANGE UP (ref 3.8–10.5)
WBC # FLD AUTO: 10.19 K/UL — SIGNIFICANT CHANGE UP (ref 3.8–10.5)

## 2021-06-17 PROCEDURE — 93018 CV STRESS TEST I&R ONLY: CPT | Mod: GC

## 2021-06-17 PROCEDURE — 99232 SBSQ HOSP IP/OBS MODERATE 35: CPT

## 2021-06-17 PROCEDURE — 93016 CV STRESS TEST SUPVJ ONLY: CPT | Mod: GC

## 2021-06-17 PROCEDURE — 78452 HT MUSCLE IMAGE SPECT MULT: CPT | Mod: 26

## 2021-06-17 RX ORDER — ATORVASTATIN CALCIUM 80 MG/1
1 TABLET, FILM COATED ORAL
Qty: 30 | Refills: 0
Start: 2021-06-17 | End: 2021-07-16

## 2021-06-17 RX ORDER — SIMVASTATIN 20 MG/1
1 TABLET, FILM COATED ORAL
Qty: 0 | Refills: 0 | DISCHARGE

## 2021-06-17 RX ADMIN — Medication 81 MILLIGRAM(S): at 11:39

## 2021-06-17 RX ADMIN — SODIUM CHLORIDE 3 MILLILITER(S): 9 INJECTION INTRAMUSCULAR; INTRAVENOUS; SUBCUTANEOUS at 13:31

## 2021-06-17 RX ADMIN — Medication 12.5 MILLIGRAM(S): at 07:54

## 2021-06-17 RX ADMIN — Medication 12.5 MILLIGRAM(S): at 17:45

## 2021-06-17 RX ADMIN — Medication 0.6 MILLIGRAM(S): at 17:45

## 2021-06-17 RX ADMIN — SODIUM CHLORIDE 3 MILLILITER(S): 9 INJECTION INTRAMUSCULAR; INTRAVENOUS; SUBCUTANEOUS at 05:54

## 2021-06-17 RX ADMIN — Medication 0.6 MILLIGRAM(S): at 07:55

## 2021-06-17 NOTE — DISCHARGE NOTE NURSING/CASE MANAGEMENT/SOCIAL WORK - PATIENT PORTAL LINK FT
You can access the FollowMyHealth Patient Portal offered by [facility] by registering at the following website: [email or web address]. By joining [employer]’s FollowMyHealth portal, you will also be able to view your health information using other applications (apps) compatible with our system.

## 2021-06-17 NOTE — DISCHARGE NOTE PROVIDER - NSDCMRMEDTOKEN_GEN_ALL_CORE_FT
aspirin 81 mg oral tablet: 1 tab(s) orally once a day  atorvastatin 40 mg oral tablet: 1 tab(s) orally once a day (at bedtime)  metoprolol tartrate 25 mg oral tablet: 0.5 tab(s) orally 2 times a day

## 2021-06-17 NOTE — PROGRESS NOTE ADULT - SUBJECTIVE AND OBJECTIVE BOX
Patient is Mandarin speaking.   # [id number].   Patient seen earlier this morning.   Patient s/p RF ablation for suppression of PVC's. Tolerated the procedure well. No complications . She had moderate chest discomfort post procedure, treated with colchicine.  Today chest pain completely resolved.    During the procedure she had post Dobutamine PVC's of different morphologies. She had NST today for ischemic evaluation and was found to have a fixed defect in the basal, marycarmen- septal wall. This was not evident on previous stress test  or cardiac cath .     Vital Signs Last 24 Hrs  T(C): 36.7 (2021 17:38), Max: 36.8 (2021 19:21)  T(F): 98 (2021 17:38), Max: 98.3 (2021 22:40)  HR: 67 (2021 17:38) (64 - 78)  BP: 155/68 (2021 17:38) (122/67 - 157/81)  BP(mean): --  RR: 16 (2021 17:38) (16 - 18)  SpO2: 100% (2021 17:38) (98% - 100%)      Telemetry:  Normal sinus rhythm. No PVC's.   MEDICATIONS  (STANDING):  aspirin enteric coated 81 milliGRAM(s) Oral daily  atorvastatin 40 milliGRAM(s) Oral at bedtime  colchicine 0.6 milliGRAM(s) Oral two times a day  metoprolol tartrate 12.5 milliGRAM(s) Oral two times a day  sodium chloride 0.9% lock flush 3 milliLiter(s) IV Push every 8 hours    MEDICATIONS  (PRN):          Physical exam:   Gen- well developed well nourished NAD  Resp- clear to auscultation. No wheezing, rales or rhonchi  CV- S1 and S2 RRR. No murmurs, gallops or rubs  ABD- soft nontender + bowel sounds  EXT- no edema No calf tenderness. Right groin without bleeding, ecchymosis or hematoma. No pain at the site.   Neuro- grossly nonfocal                            12.6   10.19 )-----------( 217      ( 2021 07:15 )             40.3       06-17    138  |  101  |  17  ----------------------------<  108<H>  3.8   |  22  |  0.69    Ca    9.5      2021 07:15  Phos  3.1     -17  Mg     2.1     -          NUCLEAR STRESS TEST;     < from: Nuclear Stress Test-Pharmacologic (21 @ 09:41) >  PATIENT: RAZA LLAMAS  : 1945   AGE: 75 (F)   MR#: [id number]  STUDY DATE: 2021  LOCATION: [address] PHYSICIAN(S): Cedric Maldonado MD  FELLOW:  ------------------------------------------------------------------------  TYPE OF TEST: Stress/Rest  Pharmacologic  INDICATION: Other chest pain (R07.89)  ------------------------------------------------------------------------  HISTORY:  CARDIAC HISTORY: 75 years old female with PMH of HTN, HLD,  symptomatic RVOT PVCs s/p ablation  and 21,  is  here for cardiac evaluation due to CP post ablation.  OTHER HISTORY: Cath 2018: nml coronary arteries  RISK FACTORS: Elevated Cholesterol, Hypertension, Post  Menopausal  MEDICATIONS: ASA, Lipitor, Colchicine, Lopressor  ------------------------------------------------------------------------  BASELINE ELECTROCARDIOGRAM:  Rhythm: Normal Sinus Rhythm - 67 BPM  ST: T wave abnormality in V2, V3.  ------------------------------------------------------------------------  HEMODYNAMIC PARAMETERS:                                      HR      BP  Baseline  Pre-TM Start              67  139/73  01:30     Inject Regadenoson on TM  85  02:00     Post Injection on TM      86  145/70  03:00     Post Injection on TM      85  106/63  04:00     Post Injection on TM      85  108/53  05:00     Post Injection on TM      83  102/50  06:00     Recovery                  83  105/53  07:00     Recovery                  77  118/58  08:00     Recovery                  80  111/58  09:00     Recovery                  68  111/59  10:00     Recovery                  68  116/60  12:00     Recovery                  66  118/65  ------------------------------------------------------------------------  Agent: Regadenoson 0.4 mg/5 ml NS. injected over10 sec.  Aminophylline: 75 mg  HR: Baseline HR: 67 bpm   Peak HR: 86 bpm (59% of MPHR)  MPHR: 146 bpm   85% of MPHR: 124 bpm  BP: Baseline BP: 139/73 mmHg   Peak BP: 145/70 mmHg   Peak  RPP: 36307 (Rate Pressure Product)  Last Caffeine intake: 12 hrs  Terminated: Completion of protocol  Comments: Patient completed 1:56 mins on TM, stopping due  to fatigue and dizziness at a heart rate of 94  BPM(64%MPHR)and bp of 145/70.Test switched to  pharmacological stress.  ------------------------------------------------------------------------  SYMPTOMS/FINDINGS:  Symptoms: no chest pain  Chest pain: No chest pain with administration of  Regadenoson  Treatment: None  < from: Nuclear Stress Test-Pharmacologic (21 @ 09:41) >  ECG ABNORMALITIES DURING/AFTER STRESS:   Abnormalities: None  ------------------------------------------------------------------------  NEW ARRHYTHMIAS DEVELOPED DURING/AFTER STRESS:  None  ------------------------------------------------------------------------  STRESS TEST IMPRESSIONS:   59% of MPHR.  Chest Pain: No chest pain with administration of  Regadenoson.  Symptom: no chest pain.  HR Response: Appropriate.  BP Response: Appropriate.  Heart Rhythm: Normal Sinus Rhythm - 67 BPM.  Baseline ECG: T wave abnormality in V2, V3.  ECG Abnormalities: None.  Arrhythmia: None.  ------------------------------------------------------------------------  PROCEDURE:  7.54 mCi of Tc 99m Tetrofosmin were injected during stress  protocol. Approximately 45 minutes later, tomographic  images were obtained in a 180 degree arc from right  anterior oblique to left anterior oblique with 64 stops.  At a separate time on 2021, 23 mCi of Tc 99m  Tetrofosmin were injected at rest. Approximately 45  minute(s) later,tomographic images were obtained in a 180  degree arc from right anterior oblique to left anterior  oblique with 64 stops. The tomographic slices were  reconstructed in 3 orthogonal planes (short axis,  horizontal long axis and vertical long axis).  Interpretation was performed both by visual and  quantitative analysis.  Rest and stress images were acquired using CZT-based  system with pinhole collimation ([employer], [employer]), and reconstructed using MLEM algorithm.  Images were re-acquired with the patient in a prone  position.  ------------------------------------------------------------------------  NUCLEAR FINDINGS:  Review of raw data shows: The study is of good technical  quality.  The left ventricle was normal in size. There is a small,  moderate defect in basal anteroseptal wall that is fixed,  suggestive of small infarct.  ------------------------------------------------------------------------  GATED ANALYSIS:  Post-stress gated wall motion analysis was performed(LVEF  = 60 %;LVEDV = 68 ml.), revealing normal LV function.  There was diminished basal anteroseptal systolic  < from: Nuclear Stress Test-Pharmacologic (21 @ 09:41) >  thickening, with preserved wall motion. The LV time  activity curve suggested diastolic dysfunction. RV  function appeared normal.  ------------------------------------------------------------------------  IMPRESSIONS:Abnormal Study  * Myocardial Perfusion SPECT results are abnormal.  * Review of raw data shows: The study is of good technical  quality.  * The left ventricle was normal in size. There is a small,  moderate defect in basal anteroseptal wall that is fixed,  suggestive of small infarct.  * Post-stress gated wall motion analysis was performed  (LVEF = 60 %;LVEDV = 68 ml.), revealing normal LV  function. There was diminished basal anteroseptal systolic  thickening, with preserved wall motion. The LV time  activity curve suggested diastolic dysfunction. RV  function appeared normal.  ------------------------------------------------------------------------  Confirmed  - 16:30:34 by Padilla Lee M.D.  ------------------------------------------------------------------------

## 2021-06-17 NOTE — DISCHARGE NOTE PROVIDER - PROVIDER TOKENS
PROVIDER:[TOKEN:[[id number]]],PROVIDER:[TOKEN:[[id number]],SCHEDULEDAPPT:[07/20/2021],SCHEDULEDAPPTTIME:[10:00 PM]]

## 2021-06-17 NOTE — DISCHARGE NOTE PROVIDER - NSDCCPCAREPLAN_GEN_ALL_CORE_FT
PRINCIPAL DISCHARGE DIAGNOSIS  Diagnosis: Frequent PVCs  Assessment and Plan of Treatment: Patient had an elective ablation while inpatient. Patient had a stress test. Follow up with EP: Dr. Maldonado July 20th at 10:30AM

## 2021-06-17 NOTE — PROGRESS NOTE ADULT - ATTENDING COMMENTS
Patient is Mandarin speaking.   # [id number].   Patient s/p RF ablation for suppression of PVC's. Tolerated the procedure well. No complications . She had moderate chest discomfort post procedure, treated with colchicine.  Today chest pain completely resolved.    During the procedure she had post Dobutamine PVC's of different morphologies. She had NST today for ischemic evaluation and was found to have a fixed defect in the basal, marycarmen- septal wall. This was not evident on previous stress test 2019 or cardiac cath 2018. Patient asymptomatic and clinically stable.   Post procedure ablation teaching done with patient and her daughter.   Written instructions and contact information provided.   Labs reviewed and stable.   She has a follow-up appointment with Dr. Maldonado on 7/20/21 at 10:30am 4th floor Oncology building [phone].   She will also follow-up with her cardiologist, Dr. Tolentino.   She is for discharge today.

## 2021-06-17 NOTE — DISCHARGE NOTE PROVIDER - HOSPITAL COURSE
74 YO female presents today for elective PVC ablation    Ablation:   6/16: s/p PVC ablation. RFV access. complaining of 4/10 chest pain post ablation. ECG x2 post procedure - no new changes. Dr. Maldonado recommended to continue observation  6/17: NST: Abnormal Study  * Myocardial Perfusion SPECT results are abnormal.  * Review of raw data shows: The study is of good technical  quality.  * The left ventricle was normal in size. There is a small,  moderate defect in basal anteroseptal wall that is fixed,  suggestive of small infarct.  * Post-stress gated wall motion analysis was performed  (LVEF = 60 %;LVEDV = 68 ml.), revealing normal LV  function. There was diminished basal anteroseptal systolic  thickening, with preserved wall motion. The LV time  activity curve suggested diastolic dysfunction. RV  function appeared normal.   Follow up with EP: Dr. Maldonado July 20th at 10:30AM     6/17: Case discussed with EP Team, Samina. Patient is stable for discharge. Medications reviewed and sent to agreed upon pharmacy.

## 2021-06-17 NOTE — DISCHARGE NOTE PROVIDER - CARE PROVIDERS DIRECT ADDRESSES
[email or web address]

## 2021-06-17 NOTE — DISCHARGE NOTE PROVIDER - CARE PROVIDER_API CALL
Tucker Tolentino; PhD)  Cardiology; Internal Medicine; Vascular Medicine  [address] [place] [address], [id number]  [place] [address]  Phone: [phone]  Fax: [phone]  Follow Up Time:     Cedric Maldonado (MD)  Cardiac Electrophysiology; Cardiovascular Disease; Internal Medicine  [address] [place] [address], [id number]  [place] [address]  Phone: [phone]  Fax: [phone]  Scheduled Appointment: 07/20/2021 10:00 PM

## 2021-07-03 ENCOUNTER — NON-APPOINTMENT (OUTPATIENT)
Age: 76
End: 2021-07-03

## 2021-07-03 DIAGNOSIS — R10.31 RIGHT LOWER QUADRANT PAIN: ICD-10-CM

## 2021-07-06 PROBLEM — I49.3 VENTRICULAR PREMATURE DEPOLARIZATION: Chronic | Status: ACTIVE | Noted: 2019-04-17

## 2021-07-20 ENCOUNTER — APPOINTMENT (OUTPATIENT)
Dept: ELECTROPHYSIOLOGY | Facility: CLINIC | Age: 76
End: 2021-07-20
Payer: MEDICARE

## 2021-07-20 ENCOUNTER — NON-APPOINTMENT (OUTPATIENT)
Age: 76
End: 2021-07-20

## 2021-07-20 ENCOUNTER — APPOINTMENT (OUTPATIENT)
Dept: CV DIAGNOSITCS | Facility: HOSPITAL | Age: 76
End: 2021-07-20
Payer: MEDICARE

## 2021-07-20 ENCOUNTER — OUTPATIENT (OUTPATIENT)
Dept: OUTPATIENT SERVICES | Facility: HOSPITAL | Age: 76
LOS: 1 days | End: 2021-07-20

## 2021-07-20 VITALS
RESPIRATION RATE: 16 BRPM | HEIGHT: 62 IN | DIASTOLIC BLOOD PRESSURE: 84 MMHG | BODY MASS INDEX: 27.23 KG/M2 | SYSTOLIC BLOOD PRESSURE: 168 MMHG | OXYGEN SATURATION: 96 % | HEART RATE: 60 BPM | WEIGHT: 148 LBS | TEMPERATURE: 94.2 F

## 2021-07-20 DIAGNOSIS — R10.31 RIGHT LOWER QUADRANT PAIN: ICD-10-CM

## 2021-07-20 PROCEDURE — 93925 LOWER EXTREMITY STUDY: CPT | Mod: 26

## 2021-07-20 PROCEDURE — 99213 OFFICE O/P EST LOW 20 MIN: CPT

## 2021-07-20 PROCEDURE — 93000 ELECTROCARDIOGRAM COMPLETE: CPT

## 2021-07-20 RX ORDER — METOPROLOL TARTRATE 25 MG/1
25 TABLET, FILM COATED ORAL
Qty: 90 | Refills: 3 | Status: DISCONTINUED | COMMUNITY
End: 2021-07-20

## 2021-07-20 RX ORDER — SIMVASTATIN 20 MG/1
20 TABLET, FILM COATED ORAL DAILY
Qty: 90 | Refills: 3 | Status: DISCONTINUED | COMMUNITY
Start: 2018-03-10 | End: 2021-07-20

## 2021-07-20 NOTE — DISCUSSION/SUMMARY
[FreeTextEntry1] : Diana Cisneros is a 76 y/o woman, Mandarin speaking, with Hx of HTN, HLD and symptomatic PVCs s/p AMC PVC ablation on 4/17/2019 who presents today for routine f/u. \par \par Impression:\par \par 1. PVCs: s/p AMC PVC ablation on 4/17/2019 and ablation of RVOT PVCs on 6/16/2021. EKG performed today to assess for presence of recurrent PVCs and reveals NSR and no PVCs. Duplex of right groin showed no significant abnormalities.  \par \par 2. HTN: resume oral antihypertensives as prescribed. Encouraged heart healthy diet, sodium restriction, and weight loss. Continue regular f/u with Cardiologist for further HTN management.  BP high today and patient to go back to Dr. Tolentino for further management. Renew amlodipine 2.5 mg for HTN> \par \par 3. HLD: resume statin therapy as prescribed and regular f/u with Cardiologist for routine lipid monitoring and management.\par \par \par \par Sincerely,\par \par Cedric Maldonado MD

## 2021-07-20 NOTE — PHYSICAL EXAM
[General Appearance - Well Developed] : well developed [Normal Appearance] : normal appearance [Well Groomed] : well groomed [General Appearance - Well Nourished] : well nourished [No Deformities] : no deformities [General Appearance - In No Acute Distress] : no acute distress [Normal Conjunctiva] : the conjunctiva exhibited no abnormalities [Eyelids - No Xanthelasma] : the eyelids demonstrated no xanthelasmas [Normal Oral Mucosa] : normal oral mucosa [No Oral Pallor] : no oral pallor [No Oral Cyanosis] : no oral cyanosis [Normal Jugular Venous A Waves Present] : normal jugular venous A waves present [Normal Jugular Venous V Waves Present] : normal jugular venous V waves present [No Jugular Venous Gomes A Waves] : no jugular venous gomes A waves [Respiration, Rhythm And Depth] : normal respiratory rhythm and effort [Exaggerated Use Of Accessory Muscles For Inspiration] : no accessory muscle use [Heart Rate And Rhythm] : heart rate and rhythm were normal [Auscultation Breath Sounds / Voice Sounds] : lungs were clear to auscultation bilaterally [Heart Sounds] : normal S1 and S2 [Murmurs] : no murmurs present [Abdomen Soft] : soft [Abdomen Tenderness] : non-tender [Abdomen Mass (___ Cm)] : no abdominal mass palpated [Gait - Sufficient For Exercise Testing] : the gait was sufficient for exercise testing [Abnormal Walk] : normal gait [Nail Clubbing] : no clubbing of the fingernails [Cyanosis, Localized] : no localized cyanosis [Petechial Hemorrhages (___cm)] : no petechial hemorrhages [Skin Color & Pigmentation] : normal skin color and pigmentation [] : no rash [No Venous Stasis] : no venous stasis [Skin Lesions] : no skin lesions [No Skin Ulcers] : no skin ulcer [No Xanthoma] : no  xanthoma was observed [Oriented To Time, Place, And Person] : oriented to person, place, and time [Affect] : the affect was normal [Mood] : the mood was normal [No Anxiety] : not feeling anxious

## 2021-07-20 NOTE — HISTORY OF PRESENT ILLNESS
[FreeTextEntry1] : Tucker Tolentino MD\par \par Diana Cisneros is a 76 y/o woman, Mandarin speaking, with Hx of HTN, HLD and symptomatic PVCs s/p AMC PVC ablation on 4/17/2019 who presents today for routine f/u. Has been feeling PVCs again more frequently and was seen in the hospital back in December and was given metoprolol with some relief. Has been maintained on metoprolol tart 12.5mg BID with symptom improvement. Last ECHO 12/2020 with normal LVEF. When having frequent PVCs, she feels more dizzy and chest discomfort but has had improvement now with metoprolol use. Denies chest pain, SOB, syncope or near syncope.\par \sana Underwent ablation June 16, 2021 and now feels better from arrhythmia point of view.  Has fatigue which she does not think is due to beta blockers. Was taking both atorvastatin and simvastatin and told to d/c simvastatin. \par

## 2021-08-04 ENCOUNTER — APPOINTMENT (OUTPATIENT)
Dept: CARDIOLOGY | Facility: CLINIC | Age: 76
End: 2021-08-04
Payer: MEDICARE

## 2021-08-04 ENCOUNTER — NON-APPOINTMENT (OUTPATIENT)
Age: 76
End: 2021-08-04

## 2021-08-04 VITALS
RESPIRATION RATE: 16 BRPM | OXYGEN SATURATION: 98 % | BODY MASS INDEX: 27.07 KG/M2 | TEMPERATURE: 97 F | WEIGHT: 148 LBS | HEART RATE: 67 BPM | SYSTOLIC BLOOD PRESSURE: 146 MMHG | DIASTOLIC BLOOD PRESSURE: 82 MMHG

## 2021-08-04 DIAGNOSIS — Z00.00 ENCOUNTER FOR GENERAL ADULT MEDICAL EXAMINATION W/OUT ABNORMAL FINDINGS: ICD-10-CM

## 2021-08-04 PROCEDURE — 99214 OFFICE O/P EST MOD 30 MIN: CPT

## 2021-08-04 PROCEDURE — 93000 ELECTROCARDIOGRAM COMPLETE: CPT

## 2022-01-05 ENCOUNTER — NON-APPOINTMENT (OUTPATIENT)
Age: 77
End: 2022-01-05

## 2022-01-05 ENCOUNTER — APPOINTMENT (OUTPATIENT)
Dept: CARDIOLOGY | Facility: CLINIC | Age: 77
End: 2022-01-05
Payer: MEDICARE

## 2022-01-05 VITALS
DIASTOLIC BLOOD PRESSURE: 83 MMHG | SYSTOLIC BLOOD PRESSURE: 131 MMHG | HEART RATE: 80 BPM | BODY MASS INDEX: 27.25 KG/M2 | OXYGEN SATURATION: 97 % | WEIGHT: 149 LBS | RESPIRATION RATE: 16 BRPM

## 2022-01-05 DIAGNOSIS — R07.89 OTHER CHEST PAIN: ICD-10-CM

## 2022-01-05 DIAGNOSIS — Z98.890 OTHER SPECIFIED POSTPROCEDURAL STATES: ICD-10-CM

## 2022-01-05 DIAGNOSIS — R06.02 SHORTNESS OF BREATH: ICD-10-CM

## 2022-01-05 DIAGNOSIS — E78.5 HYPERLIPIDEMIA, UNSPECIFIED: ICD-10-CM

## 2022-01-05 DIAGNOSIS — Z86.79 OTHER SPECIFIED POSTPROCEDURAL STATES: ICD-10-CM

## 2022-01-05 DIAGNOSIS — R00.2 PALPITATIONS: ICD-10-CM

## 2022-01-05 DIAGNOSIS — I10 ESSENTIAL (PRIMARY) HYPERTENSION: ICD-10-CM

## 2022-01-05 DIAGNOSIS — R94.31 ABNORMAL ELECTROCARDIOGRAM [ECG] [EKG]: ICD-10-CM

## 2022-01-05 DIAGNOSIS — I49.3 VENTRICULAR PREMATURE DEPOLARIZATION: ICD-10-CM

## 2022-01-05 PROCEDURE — 93000 ELECTROCARDIOGRAM COMPLETE: CPT

## 2022-01-05 PROCEDURE — 99214 OFFICE O/P EST MOD 30 MIN: CPT

## 2022-01-05 RX ORDER — ASPIRIN ENTERIC COATED TABLETS 81 MG 81 MG/1
81 TABLET, DELAYED RELEASE ORAL
Qty: 90 | Refills: 3 | Status: ACTIVE | COMMUNITY
Start: 2018-03-10 | End: 1900-01-01

## 2022-01-05 RX ORDER — AMLODIPINE BESYLATE 2.5 MG/1
2.5 TABLET ORAL DAILY
Qty: 30 | Refills: 1 | Status: ACTIVE | COMMUNITY
Start: 2018-03-10 | End: 1900-01-01

## 2022-01-05 RX ORDER — METOPROLOL SUCCINATE 25 MG/1
25 TABLET, EXTENDED RELEASE ORAL DAILY
Qty: 90 | Refills: 3 | Status: ACTIVE | COMMUNITY
Start: 2021-07-20 | End: 1900-01-01

## 2022-01-05 RX ORDER — ATORVASTATIN CALCIUM 40 MG/1
40 TABLET, FILM COATED ORAL
Qty: 90 | Refills: 3 | Status: ACTIVE | COMMUNITY
Start: 2021-07-20 | End: 1900-01-01

## 2022-06-18 NOTE — ED PROVIDER NOTE - TELEPHONIC ID NUMBER OF THE INTERPRETER
Goal Outcome Evaluation:  Plan of Care Reviewed With: patient        Pt is status post operative total hip posterior liner change arthroplasty revision day 1.This visit she walked 100'CGA and tolerated with mild complaints of pain. She does not have stairs to get into her house and she will have assistance at her home at discharge. Plan is discharge today.    [id number]

## 2022-08-03 NOTE — CONSULT NOTE ADULT - CONSULT REASON
Large ONH's. LVF 8/3/22 Normal ou. OCT 9/27/21 stable from prev one- Normal. RNFL 93/88 from 131/90. Borderline oS GCC. Intermittent chest pain

## 2022-11-03 NOTE — PATIENT PROFILE ADULT - FALL HARM RISK TYPE OF ASSESSMENT
CARDIAC CLEARANCE      What type of procedure are you having? TRANSFER ADJACENT TISSUE HEAD/ 2ND ATTEMPT 1ST SX DID NOT WORK      Which physician is performing your procedure? DR. Joseph Barriga      When is your procedure scheduled for?  11/15/22     Where are you having this procedure? [facility]     Are you taking Blood Thinners? If so what? (Name/dose/frequesncy)  PLAVIX, BABY ASPIRIN                 Does the surgeon want you to stop your blood thinner? If so for how long?   YES, 3 DAYS PRIOR     Phone Number and Contact Name for Physicians office:  [phone]     Fax number to send information:     [phone]    Herb# [phone] admission

## 2023-09-11 NOTE — ED CDU PROVIDER DISPOSITION NOTE - NS ED MD EM SUBS DAY SELECT 1
Problem: Adult Inpatient Plan of Care  Goal: Plan of Care Review  Outcome: Ongoing, Progressing  Goal: Patient-Specific Goal (Individualized)  Outcome: Ongoing, Progressing  Goal: Absence of Hospital-Acquired Illness or Injury  Outcome: Ongoing, Progressing  Goal: Optimal Comfort and Wellbeing  Outcome: Ongoing, Progressing  Goal: Readiness for Transition of Care  Outcome: Ongoing, Progressing     Problem: Oral Intake Inadequate (Acute Kidney Injury/Impairment)  Goal: Optimal Nutrition Intake  Outcome: Ongoing, Progressing     Problem: Renal Function Impairment (Acute Kidney Injury/Impairment)  Goal: Effective Renal Function  Outcome: Ongoing, Progressing     Problem: Skin Injury Risk Increased  Goal: Skin Health and Integrity  Outcome: Ongoing, Progressing      [id number] - Disposition Day Code

## 2024-01-01 NOTE — PHYSICAL EXAM
[General Appearance - Well Developed] : well developed [Normal Appearance] : normal appearance [Well Groomed] : well groomed [General Appearance - Well Nourished] : well nourished [No Deformities] : no deformities [General Appearance - In No Acute Distress] : no acute distress [Normal Conjunctiva] : the conjunctiva exhibited no abnormalities [Eyelids - No Xanthelasma] : the eyelids demonstrated no xanthelasmas [Normal Oral Mucosa] : normal oral mucosa [No Oral Pallor] : no oral pallor [No Oral Cyanosis] : no oral cyanosis [Normal Jugular Venous A Waves Present] : normal jugular venous A waves present [Normal Jugular Venous V Waves Present] : normal jugular venous V waves present [No Jugular Venous Gomes A Waves] : no jugular venous gomes A waves [Heart Rate And Rhythm] : heart rate and rhythm were normal [Heart Sounds] : normal S1 and S2 [Murmurs] : no murmurs present [Respiration, Rhythm And Depth] : normal respiratory rhythm and effort [Exaggerated Use Of Accessory Muscles For Inspiration] : no accessory muscle use [Auscultation Breath Sounds / Voice Sounds] : lungs were clear to auscultation bilaterally [Abdomen Soft] : soft [Abdomen Tenderness] : non-tender [Abdomen Mass (___ Cm)] : no abdominal mass palpated [Abnormal Walk] : normal gait [Gait - Sufficient For Exercise Testing] : the gait was sufficient for exercise testing [Nail Clubbing] : no clubbing of the fingernails [Cyanosis, Localized] : no localized cyanosis [Petechial Hemorrhages (___cm)] : no petechial hemorrhages [Skin Color & Pigmentation] : normal skin color and pigmentation [] : no rash [No Venous Stasis] : no venous stasis [Skin Lesions] : no skin lesions 1 [No Skin Ulcers] : no skin ulcer [No Xanthoma] : no  xanthoma was observed [Oriented To Time, Place, And Person] : oriented to person, place, and time [Affect] : the affect was normal [Mood] : the mood was normal [No Anxiety] : not feeling anxious

## 2025-01-06 NOTE — CONSULT NOTE ADULT - CONSULT REQUESTED DATE/TIME
CERTIFICATE OF WORK       January 6, 2025      Re: Selena Valencia  [address]  [place] [address]      This is to certify that Selena Valencia has been under my care from 1/6/2025 and is excused from work on 1/2/25 to 1/3/25              SIGNATURE:___________________________________________          Delilah Henson PA-C  [facility]  [address]  [facility] [address]  Phone: [phone]  Fax: [phone]  
25-Dec-2020 06:22